# Patient Record
Sex: FEMALE | Race: WHITE | NOT HISPANIC OR LATINO | Employment: OTHER | URBAN - NONMETROPOLITAN AREA
[De-identification: names, ages, dates, MRNs, and addresses within clinical notes are randomized per-mention and may not be internally consistent; named-entity substitution may affect disease eponyms.]

---

## 2017-01-12 RX ORDER — FLUCONAZOLE 150 MG/1
150 TABLET ORAL ONCE
Qty: 2 TABLET | Refills: 0 | Status: SHIPPED | OUTPATIENT
Start: 2017-01-12 | End: 2017-01-12

## 2017-01-12 NOTE — PROGRESS NOTES
Pt stopped in [facility] location. Having thick white discharge and itching. I have no way of testing for yeast here. I will call in 2 diflucan as pt is establish and I've seen her twice. RTC if symptoms persist.

## 2017-04-05 RX ORDER — DILTIAZEM HYDROCHLORIDE 180 MG/1
CAPSULE, COATED, EXTENDED RELEASE ORAL
Qty: 30 CAPSULE | Refills: 5 | Status: SHIPPED | OUTPATIENT
Start: 2017-04-05 | End: 2017-12-01 | Stop reason: SDUPTHER

## 2017-08-14 ENCOUNTER — OFFICE VISIT (OUTPATIENT)
Dept: ENDOCRINOLOGY | Facility: CLINIC | Age: 47
End: 2017-08-14

## 2017-08-14 ENCOUNTER — TELEPHONE (OUTPATIENT)
Dept: ENDOCRINOLOGY | Facility: CLINIC | Age: 47
End: 2017-08-14

## 2017-08-14 VITALS
SYSTOLIC BLOOD PRESSURE: 126 MMHG | HEIGHT: 58 IN | WEIGHT: 173.3 LBS | HEART RATE: 118 BPM | BODY MASS INDEX: 36.38 KG/M2 | DIASTOLIC BLOOD PRESSURE: 72 MMHG

## 2017-08-14 DIAGNOSIS — E78.2 MIXED DIABETIC HYPERLIPIDEMIA ASSOCIATED WITH TYPE 2 DIABETES MELLITUS (HCC): ICD-10-CM

## 2017-08-14 DIAGNOSIS — E11.69 MIXED DIABETIC HYPERLIPIDEMIA ASSOCIATED WITH TYPE 2 DIABETES MELLITUS (HCC): ICD-10-CM

## 2017-08-14 DIAGNOSIS — I15.2 HYPERTENSION ASSOCIATED WITH DIABETES (HCC): ICD-10-CM

## 2017-08-14 DIAGNOSIS — E11.9 TYPE 2 DIABETES MELLITUS WITH FEATURES OF INSULIN RESISTANCE (HCC): Primary | ICD-10-CM

## 2017-08-14 DIAGNOSIS — E11.59 HYPERTENSION ASSOCIATED WITH DIABETES (HCC): ICD-10-CM

## 2017-08-14 DIAGNOSIS — Z79.4 TYPE 2 DIABETES MELLITUS WITHOUT COMPLICATION, WITH LONG-TERM CURRENT USE OF INSULIN (HCC): Primary | ICD-10-CM

## 2017-08-14 DIAGNOSIS — E11.9 TYPE 2 DIABETES MELLITUS WITHOUT COMPLICATION, WITH LONG-TERM CURRENT USE OF INSULIN (HCC): Primary | ICD-10-CM

## 2017-08-14 LAB — GLUCOSE BLDC GLUCOMTR-MCNC: 340 MG/DL (ref 70–130)

## 2017-08-14 PROCEDURE — PTNOCHG PR CUSTOM PT NO CHARGE VISIT: Performed by: INTERNAL MEDICINE

## 2017-08-14 PROCEDURE — 99204 OFFICE O/P NEW MOD 45 MIN: CPT | Performed by: INTERNAL MEDICINE

## 2017-08-14 PROCEDURE — 82962 GLUCOSE BLOOD TEST: CPT | Performed by: INTERNAL MEDICINE

## 2017-08-14 NOTE — PROGRESS NOTES
Humberto Colvin is a 46 y.o. female seen by diabetes educator 08/14/2017 for insertion of Crispin diagnostic continuous glucose monitor.     Sensor inserted in office. Instructed patient to wear sensor up to 14 days. Patient is to return to clinic in 2 weeks for sensor removal and results. Instructed patient to remove sensor if he has a CT scan, MRI, or X-ray, or if skin irritation occurs.      Carbohydrate Counting   i. Provided patient with detailed carbohydrate counting guide   ii. Instructed patient to eat 45-60 grams of carbohydrate with each meal and 15-30 grams of carbohydrate for snacks   iii. Provided patient with list of non-starchy vegetables and foods that are low in carbohydrate for snacks and to incorporate with meals    iv. Reviewed carbohydrate-containing foods, standard serving sizes, measuring foods, and nutrition label reading.    v. Reviewed the difference between simple and complex carbohydrate. Encouraged patient to choose complex carbohydrates more often.      Hypoglycemia   i. Reviewed the signs and symptoms of low blood sugar   ii. Explained a low blood sugar is a blood glucose reading below 70 mg/dl   iii. Provided patient with handout detailing proper treatment guidelines    Dexcom referral submitted 08/14/2017.       Soraya Benavidez MS, RD, LD, CDE

## 2017-08-14 NOTE — PROGRESS NOTES
"Humberto Colvin is a 46 y.o. female who presents for  evaluation of   Chief Complaint   Patient presents with   • Diabetes       Referring provider    POORNIMA Penny  [address] [place] [address]    Primary Care Provider    POORNIMA Pina    Duration 10 years    Timing - Diabetes is Constant    Quality -  poorly controlled    Severity -  severe    Complications - peripheral neuropathy    Current symptoms/problems  increase appetite, paresthesia of the feet, polydipsia and polyuria     Alleviating Factors: Compliance      Side Effects  none    Current diet  in general, an \"unhealthy\" diet    Current exercise none    Current monitoring regimen: home blood tests - 3 times daily  Home blood sugar records: 50 to 500    Hypoglycemia yes     Past Medical History:   Diagnosis Date   • Abdominal pain, epigastric    • Abrasion     an d/or friction burn   • Abrasion     and/or friction burn of hand, INFECTED   • Acquired hypothyroidism    • Acute bronchitis    • Acute hypokalemia    • Acute otitis media     right   • Acute sinusitis    • Acute vaginitis    • Anemia    • Aphthous ulceration     of skin and/or mucous membrane      • Asthma    • Astigmatism    • Backache     chronic   • Benign neoplasm of choroid     OS   • Candidiasis     skin and vagina   • Carpal tunnel syndrome    • Cellulitis of foot    • Cobalamin deficiency    • COLD (chronic obstructive lung disease)    • Cough    • Cyst of Bartholin's gland duct    • Diabetes mellitus     no retinopathy   • Diarrhea    • Disorder     Disorder due to type 2 diabetes mellitus     • Dyslipidemia    • Dysphagia    • Dyspnea    • Dysuria    • Encounter for gynecological examination with abnormal finding    • Encounter for screening mammogram for malignant neoplasm of breast    • Epigastric pain    • Essential hypertension    • Fatigue    • Gastritis    • Generalized abdominal pain    • GERD (gastroesophageal reflux disease)    • GI " allergy to food      cow milk, shrimp, sesame seed   • HA (headache)    • Hashimoto's thyroiditis    • Hearing loss    • Heartburn    • Herpes labialis    • History of chest pain    • Hyperlipidemia    • Hypertensive disorder    • Influenza-like illness    • Insomnia    • Jaw pain    • Maculopapular rash     eruption   • Menopause    • Myopia    • Nasal vestibulitis    • Nausea and vomiting    • Neck pain     chronic   • Need for influenza vaccination    • Neurologic disorder associated with diabetes mellitus    • Neuropathy    • Obesity    • Pain in lower limb    • Pain in wrist    • Patient currently pregnant     - G 2, P 1, 0-1-1   • Peptic stricture of esophagus    • Prescription refill     renewal   • Proteinuria    • Pruritus of vagina    • Restless legs    • Right upper quadrant pain    • Serous otitis media    • Skin lesion    • Sleep apnea    • Stricture of esophagus    • Symptomatic menopausal or female climacteric states    • Tendinitis    • Tobacco use     and exposure   • Type 2 diabetes mellitus    • Upper respiratory infection    • Vomiting      Family History   Problem Relation Age of Onset   • Diabetes Mother    • Hypertension Mother    • Other Mother      respiratory disorder   • Diabetes Father    • Heart disease Father    • Hypertension Father    • Lung cancer Father    • Diabetes Paternal Grandmother    • Hypertension Paternal Grandmother    • Hypertension Paternal Grandfather    • Diabetes Paternal Grandfather    • Stroke Other      Social History   Substance Use Topics   • Smoking status: Former Smoker   • Smokeless tobacco: Never Used   • Alcohol use No         Current Outpatient Prescriptions:   •  ASPIRIN LOW DOSE PO, Take  by mouth daily., Disp: , Rfl:   •  Blood Glucose Monitoring Suppl (FREESTYLE LITE) device, , Disp: , Rfl:   •  citalopram (CeleXA) 20 MG tablet, , Disp: , Rfl:   •  clotrimazole-betamethasone (LOTRISONE) 1-0.05 % cream, Apply  topically 2 (two) times a day., Disp: 45 g,  Rfl: 0  •  diltiaZEM CD (CARDIZEM CD) 180 MG 24 hr capsule, TAKE ONE CAPSULE BY MOUTH IN THE MORNING, Disp: 30 capsule, Rfl: 5  •  Dulaglutide (TRULICITY) 1.5 MG/0.5ML solution pen-injector, Inject  under the skin., Disp: , Rfl:   •  EASY TOUCH PEN NEEDLES 31G X 6 MM misc, , Disp: , Rfl:   •  estradiol (ESTRACE) 0.5 MG tablet, Take 1 tablet by mouth daily., Disp: 30 tablet, Rfl: 11  •  fexofenadine (ALLEGRA) 180 MG tablet, , Disp: , Rfl:   •  fluticasone (FLONASE) 50 MCG/ACT nasal spray, , Disp: , Rfl:   •  furosemide (LASIX) 20 MG tablet, Take 20 mg by mouth daily., Disp: , Rfl:   •  glucose blood (ACCU-CHEK GEOVANNA PLUS) test strip, 1 each by Other route 3 (three) times a day. Use as instructed, Disp: , Rfl:   •  insulin aspart (NOVOLOG FLEXPEN) 100 UNIT/ML solution pen-injector sc pen, , Disp: , Rfl:   •  Insulin Pen Needle (NOVOFINE) 30G X 8 MM misc, As directed, Disp: , Rfl:   •  ipratropium-albuterol (DUO-NEB) 0.5-2.5 mg/mL nebulizer, , Disp: , Rfl:   •  Lancets (ACCU-CHEK SAFE-T PRO) lancets, 1 each by Other route 3 (three) times a day. 23 gauge, Disp: , Rfl:   •  levothyroxine (SYNTHROID, LEVOTHROID) 100 MCG tablet, Take 100 mcg by mouth daily., Disp: , Rfl:   •  lisinopril (PRINIVIL,ZESTRIL) 10 MG tablet, Take 10 mg by mouth daily., Disp: , Rfl:   •  metFORMIN XR (GLUCOPHAGE-XR) 500 MG 24 hr tablet, Take 1,000 mg by mouth 2 (two) times a day., Disp: , Rfl:   •  montelukast (SINGULAIR) 10 MG tablet, , Disp: , Rfl:   •  potassium chloride ER (K-TAB) 20 MEQ tablet controlled-release ER tablet, , Disp: , Rfl:   •  pramipexole (MIRAPEX) 0.5 MG tablet, Take 1 tablet by mouth every night., Disp: , Rfl:   •  ranitidine (ZANTAC) 150 MG tablet, Take 150 mg by mouth 2 (two) times a day., Disp: , Rfl:   •  TRESIBA FLEXTOUCH 200 UNIT/ML solution pen-injector, , Disp: , Rfl:   •  albuterol (PROAIR HFA) 108 (90 BASE) MCG/ACT inhaler, Inhale 2 puffs 4 (four) times a day as needed for wheezing., Disp: , Rfl:   •  albuterol  (VENTOLIN HFA) 108 (90 BASE) MCG/ACT inhaler, , Disp: , Rfl:   •  azithromycin (ZITHROMAX Z-MYKE) 250 MG tablet, Take 2 tablets the first day, then 1 tablet daily for 4 days., Disp: 6 tablet, Rfl: 0  •  gemfibrozil (LOPID) 600 MG tablet, , Disp: , Rfl:   •  Insulin Aspart (NOVOLOG PENFILL) 100 UNIT/ML solution cartridge, Inject  under the skin. 20 unit (insulin) 3 times per day Subcutaneous, Disp: , Rfl:   •  IPRATROPIUM-ALBUTEROL IN, Inhale., Disp: , Rfl:   •  miconazole (MICONAZOLE 7) 2 % vaginal cream, , Disp: , Rfl:   •  omeprazole (PriLOSEC) 20 MG capsule, Take 20 mg by mouth 2 (two) times a day., Disp: , Rfl:   •  sitaGLIPtin (JANUVIA) 100 MG tablet, Take 100 mg by mouth daily., Disp: , Rfl:   •  SYMBICORT 160-4.5 MCG/ACT inhaler, Take 1 puff by mouth 2 (two) times a day., Disp: , Rfl:   •  vitamin D (ERGOCALCIFEROL) 23252 UNITS capsule capsule, Take 1 capsule by mouth 1 (one) time per week., Disp: , Rfl:   •  ZETIA 10 MG tablet, Take 1 tablet by mouth every night., Disp: , Rfl:   •  zolpidem (AMBIEN) 10 MG tablet, Take 1 tablet by mouth every night., Disp: , Rfl:     Review of Systems    Review of Systems   Constitutional: Positive for fatigue and unexpected weight change. Negative for activity change, appetite change, chills, diaphoresis and fever.   HENT: Negative for congestion, dental problem, drooling, ear discharge, ear pain, facial swelling, mouth sores, postnasal drip, rhinorrhea, sinus pressure, sore throat, tinnitus, trouble swallowing and voice change.    Eyes: Negative for photophobia, pain, discharge, redness, itching and visual disturbance.   Respiratory: Negative for apnea, cough, choking, chest tightness, shortness of breath, wheezing and stridor.    Cardiovascular: Negative for chest pain, palpitations and leg swelling.   Gastrointestinal: Negative for abdominal distention, abdominal pain, constipation, diarrhea, nausea and vomiting.   Endocrine: Positive for polydipsia, polyphagia and  "polyuria. Negative for cold intolerance and heat intolerance.   Genitourinary: Negative for decreased urine volume, difficulty urinating, dysuria, flank pain, frequency, hematuria and urgency.   Musculoskeletal: Positive for arthralgias and gait problem. Negative for back pain, joint swelling, myalgias, neck pain and neck stiffness.   Skin: Negative for color change, pallor, rash and wound.   Allergic/Immunologic: Negative for immunocompromised state.   Neurological: Positive for weakness and headaches. Negative for dizziness, tremors, seizures, syncope, facial asymmetry, speech difficulty, light-headedness and numbness.   Hematological: Negative for adenopathy.   Psychiatric/Behavioral: Positive for decreased concentration. Negative for agitation, behavioral problems, confusion, dysphoric mood, hallucinations, self-injury, sleep disturbance and suicidal ideas. The patient is nervous/anxious. The patient is not hyperactive.         Objective:   /72 (BP Location: Right arm, Patient Position: Sitting, Cuff Size: Adult)  Pulse 118  Ht 58\" (147.3 cm)  Wt 173 lb 4.8 oz (78.6 kg)  LMP  (Approximate)  BMI 36.22 kg/m2    Physical Exam   Constitutional: She is oriented to person, place, and time. She appears well-developed.   HENT:   Head: Normocephalic.   Right Ear: External ear normal.   Left Ear: External ear normal.   Nose: Nose normal.   Eyes: Conjunctivae and EOM are normal. No scleral icterus.   Neck: Normal range of motion. Neck supple. No tracheal deviation present. No thyromegaly present.   Cardiovascular: Normal rate, regular rhythm, normal heart sounds and intact distal pulses.  Exam reveals no gallop and no friction rub.    No murmur heard.  Pulmonary/Chest: Effort normal and breath sounds normal. No stridor. No respiratory distress. She has no wheezes. She has no rales. She exhibits no tenderness.   Abdominal: Soft. Bowel sounds are normal. She exhibits no distension and no mass. There is no " tenderness. There is no rebound and no guarding.   Musculoskeletal: Normal range of motion. She exhibits no tenderness or deformity.   Lipodystrophic features, lack of subcut tissue leads to prominent veins and musculature in legs.    Lymphadenopathy:     She has no cervical adenopathy.   Neurological: She is alert and oriented to person, place, and time. She displays normal reflexes. She exhibits normal muscle tone. Coordination normal.   Skin: No rash noted. No erythema. No pallor.   Psychiatric: She has a normal mood and affect. Her behavior is normal. Judgment and thought content normal.       Lab Review    Results for orders placed or performed in visit on 08/14/17   POC Glucose Fingerstick   Result Value Ref Range    Glucose 340 (A) 70 - 130 mg/dL           Assessment/Plan       ICD-10-CM ICD-9-CM   1. Type 2 diabetes mellitus without complication, unspecified long term insulin use status E11.9 250.00       Glycemic Management:   Lab Results   Component Value Date    HGBA1C 8.0 (H) 09/08/2015    HGBA1C 7.8 (H) 08/31/2015    HGBA1C 9.4 (H) 05/26/2015     Lab Results   Component Value Date    GLUCOSE 174 (H) 09/18/2015    BUN 9 09/18/2015    CREATININE 0.7 09/18/2015    K 3.7 09/18/2015    CO2 19.0 (L) 09/18/2015    CALCIUM 9.1 09/18/2015    ALBUMIN 3.5 09/08/2015    AST 30 09/08/2015    ALT 18 09/08/2015    ANIONGAP 13.0 09/18/2015     Lab Results   Component Value Date    WBC 7.8 09/18/2015    HGB 12.1 09/18/2015    HCT 36.8 09/18/2015    MCV 91.1 09/18/2015     09/18/2015     Insulin resistant diabetes with lipodystrophic features     Stop januvia, tresiba, novolog and for now invokana    ---    Keep trulicity and metformin      Start u500 insulin 130 for bkfast and 85 for supper    If low sugar , less than 80 at lunch or supper, decrease bkfast dose to 120    If low sugar, less than 80 , at bedtime, or before bkfast, decrease evening dose to 75    ---      If after 1 week the majority of lunch and  supper readings are more than 200 - increase bkfast dose by 10    If the majority of supper and before bkfast readings are more than 200 - increase supper dose by 10       Approve for dexcom     Approve for Dexcom    Criteria for Dexcom Approval    #1  Patient has diabetes mellitus, insulin-dependent.    #2 She performs blood glucose testing 4 times daily and blood glucose log was brought to office with variability from .    #3  She is requiring  Basal insulin  and Prandial Insulin 3 times daily for a total of 4 injections per day.    #4 Based on blood glucose readings we are making adjustments.     #5 I have personally seen patient within the past 6 months    #6 We plan on seeing her every 2-3 months for continuous adjustment of her diabetes regimen     #7 patient has hypoglycemia with unawareness.    #8 patient has day-to-day variation in her mealtime which confounds the degree of insulin dosing with multiple daily injections.    #9 patient has completed diabetes education program with us.    #10 she has demonstrated the ability to self monitor her glucose.        #11 Patient is motivated in improving  diabetes control       Lipid Management  No results found for: CHOL  Lab Results   Component Value Date    TRIG 521 (H) 09/08/2015    TRIG 701 (H) 08/31/2015    TRIG 446 (H) 05/26/2015     Lab Results   Component Value Date    HDL 41.0 09/08/2015    HDL 44.0 08/31/2015    HDL 52.0 05/26/2015     No results found for: LDLCALC  No results found for: LDL  No results found for: LDLDIRECT    Only zetia, intolerant to statins    Blood Pressure Management:    Vitals:    08/14/17 1343   BP: 126/72   Pulse: 118     Lab Results   Component Value Date    GLUCOSE 174 (H) 09/18/2015    CALCIUM 9.1 09/18/2015    .0 09/18/2015    K 3.7 09/18/2015    CO2 19.0 (L) 09/18/2015    .0 09/18/2015    BUN 9 09/18/2015    CREATININE 0.7 09/18/2015    ANIONGAP 13.0 09/18/2015               Microvascular Complication  Monitoring:      Eye Exam Evaluation, within 1 year     -----------    Last Microalbumin-Proteinuria Assessment    No results found for: MALBCRERATIO    No results found for: UTPCR    -----------      Neuropathy, yes               Weight Related:   Wt Readings from Last 3 Encounters:   08/14/17 173 lb 4.8 oz (78.6 kg)   06/04/17 176 lb (79.8 kg)   01/28/17 175 lb (79.4 kg)     Body mass index is 36.22 kg/(m^2).        Diet interventions: moderate (500 kCal/d) deficit diet.      Bone Health    Lab Results   Component Value Date    CALCIUM 9.1 09/18/2015       Thyroid Health    Lab Results   Component Value Date    TSH 0.89 09/08/2015    TSH 1.90 05/26/2015    TSH 5.86 (H) 05/09/2015       Lab Results   Component Value Date    FREET4 1.53 09/08/2015    FREET4 1.34 05/26/2015    FREET4 1.62 07/08/2014         Other Diabetes Related Aspects       Lab Results   Component Value Date    ECJZXTXA35 203 (L) 09/18/2015       Start b12     Last celiac panel     Lab Results   Component Value Date    GDPABIGA 4 07/08/2014    TTRANSGLIGA <2 07/08/2014         I reviewed and summarized records from POORNIMA Pina from 2017 and I reviewed / ordered labs.     Orders Placed This Encounter   Procedures   • POC Glucose Fingerstick         A copy of my note was sent to POORNIMA Pina    Please see my above opinion and suggestions.

## 2017-08-14 NOTE — PATIENT INSTRUCTIONS
Stop januvia, tresiba, novolog and for now invokana    ---    Keep trulicity and metformin      Start u500 insulin 130 for bkfast and 85 for supper    If low sugar , less than 80 at lunch or supper, decrease bkfast dose to 120    If low sugar, less than 80 , at bedtime, or before bkfast, decrease evening dose to 75    ---      If after 1 week the majority of lunch and supper readings are more than 200 - increase bkfast dose by 10    If the majority of supper and before bkfast readings are more than 200 - increase supper dose by 10

## 2017-08-15 ENCOUNTER — TELEPHONE (OUTPATIENT)
Dept: FAMILY MEDICINE CLINIC | Facility: CLINIC | Age: 47
End: 2017-08-15

## 2017-08-15 NOTE — TELEPHONE ENCOUNTER
[facility] IN [place] LEFT MESSAGE NEEDING TO SPEAK WITH SOMEONE ON [facility]..PLEASE CALL THEM BACK

## 2017-08-16 ENCOUNTER — TELEPHONE (OUTPATIENT)
Dept: ENDOCRINOLOGY | Facility: CLINIC | Age: 47
End: 2017-08-16

## 2017-10-02 ENCOUNTER — OFFICE VISIT (OUTPATIENT)
Dept: ENDOCRINOLOGY | Facility: CLINIC | Age: 47
End: 2017-10-02

## 2017-10-02 ENCOUNTER — TELEPHONE (OUTPATIENT)
Dept: ENDOCRINOLOGY | Facility: CLINIC | Age: 47
End: 2017-10-02

## 2017-10-02 VITALS
SYSTOLIC BLOOD PRESSURE: 138 MMHG | HEIGHT: 59 IN | BODY MASS INDEX: 38.04 KG/M2 | HEART RATE: 99 BPM | DIASTOLIC BLOOD PRESSURE: 76 MMHG | WEIGHT: 188.7 LBS

## 2017-10-02 DIAGNOSIS — E11.9 TYPE 2 DIABETES MELLITUS WITHOUT COMPLICATION, WITH LONG-TERM CURRENT USE OF INSULIN (HCC): Primary | ICD-10-CM

## 2017-10-02 DIAGNOSIS — Z79.4 TYPE 2 DIABETES MELLITUS WITHOUT COMPLICATION, WITH LONG-TERM CURRENT USE OF INSULIN (HCC): Primary | ICD-10-CM

## 2017-10-02 DIAGNOSIS — E55.9 VITAMIN D DEFICIENCY: ICD-10-CM

## 2017-10-02 DIAGNOSIS — I15.2 HYPERTENSION ASSOCIATED WITH DIABETES (HCC): ICD-10-CM

## 2017-10-02 DIAGNOSIS — E78.2 MIXED DIABETIC HYPERLIPIDEMIA ASSOCIATED WITH TYPE 2 DIABETES MELLITUS (HCC): ICD-10-CM

## 2017-10-02 DIAGNOSIS — E11.59 HYPERTENSION ASSOCIATED WITH DIABETES (HCC): ICD-10-CM

## 2017-10-02 DIAGNOSIS — E11.69 MIXED DIABETIC HYPERLIPIDEMIA ASSOCIATED WITH TYPE 2 DIABETES MELLITUS (HCC): ICD-10-CM

## 2017-10-02 DIAGNOSIS — R20.0 NUMBNESS OF FINGERS OF BOTH HANDS: ICD-10-CM

## 2017-10-02 PROCEDURE — 99214 OFFICE O/P EST MOD 30 MIN: CPT | Performed by: NURSE PRACTITIONER

## 2017-10-02 PROCEDURE — PTNOCHG PR CUSTOM PT NO CHARGE VISIT: Performed by: NURSE PRACTITIONER

## 2017-10-02 PROCEDURE — 95251 CONT GLUC MNTR ANALYSIS I&R: CPT | Performed by: NURSE PRACTITIONER

## 2017-10-02 RX ORDER — FEXOFENADINE HCL 180 MG/1
TABLET ORAL
COMMUNITY
Start: 2016-06-02 | End: 2022-09-21

## 2017-10-02 RX ORDER — IPRATROPIUM BROMIDE AND ALBUTEROL SULFATE 2.5; .5 MG/3ML; MG/3ML
SOLUTION RESPIRATORY (INHALATION)
COMMUNITY
Start: 2015-11-12 | End: 2017-12-19

## 2017-10-02 RX ORDER — FAMOTIDINE 20 MG
TABLET ORAL
COMMUNITY
Start: 2017-04-13 | End: 2018-03-07

## 2017-10-02 RX ORDER — BUDESONIDE AND FORMOTEROL FUMARATE DIHYDRATE 160; 4.5 UG/1; UG/1
AEROSOL RESPIRATORY (INHALATION)
COMMUNITY
Start: 2016-07-28 | End: 2018-03-07

## 2017-10-02 RX ORDER — METFORMIN HYDROCHLORIDE 500 MG/1
TABLET, EXTENDED RELEASE ORAL
COMMUNITY
Start: 2015-11-12 | End: 2018-02-05 | Stop reason: SDUPTHER

## 2017-10-02 RX ORDER — NYSTATIN 100000 [USP'U]/G
POWDER TOPICAL 3 TIMES DAILY
Qty: 180 G | Refills: 1 | Status: SHIPPED | OUTPATIENT
Start: 2017-10-02 | End: 2017-12-19

## 2017-10-02 RX ORDER — FLUTICASONE PROPIONATE 50 MCG
SPRAY, SUSPENSION (ML) NASAL
COMMUNITY
Start: 2016-06-02 | End: 2022-09-21

## 2017-10-02 RX ORDER — DILTIAZEM HYDROCHLORIDE 180 MG/1
CAPSULE, COATED, EXTENDED RELEASE ORAL
COMMUNITY
Start: 2016-03-29 | End: 2017-10-07 | Stop reason: SDUPTHER

## 2017-10-02 RX ORDER — FUROSEMIDE 20 MG/1
TABLET ORAL
COMMUNITY
Start: 2016-03-28 | End: 2017-12-19

## 2017-10-02 RX ORDER — LEVOTHYROXINE SODIUM 0.1 MG/1
TABLET ORAL
COMMUNITY
Start: 2015-11-12 | End: 2022-04-18

## 2017-10-02 RX ORDER — GABAPENTIN 300 MG/1
CAPSULE ORAL
COMMUNITY
Start: 2017-04-13 | End: 2017-12-19

## 2017-10-02 RX ORDER — ASPIRIN 81 MG/1
TABLET, CHEWABLE ORAL
COMMUNITY
Start: 2015-11-12

## 2017-10-02 RX ORDER — LISINOPRIL 10 MG/1
TABLET ORAL
COMMUNITY
Start: 2015-11-12 | End: 2022-04-18

## 2017-10-02 RX ORDER — INSULIN HUMAN 500 [IU]/ML
INJECTION, SOLUTION SUBCUTANEOUS
COMMUNITY
Start: 2017-09-20 | End: 2018-08-14 | Stop reason: SDUPTHER

## 2017-10-02 RX ORDER — ESTRADIOL 0.5 MG/1
TABLET ORAL
COMMUNITY
Start: 2016-06-29 | End: 2017-10-06 | Stop reason: SDUPTHER

## 2017-10-02 RX ORDER — PRAMIPEXOLE DIHYDROCHLORIDE 0.5 MG/1
TABLET ORAL
COMMUNITY
Start: 2016-09-13 | End: 2017-12-19

## 2017-10-02 RX ORDER — MONTELUKAST SODIUM 10 MG/1
TABLET ORAL
COMMUNITY
Start: 2016-06-02 | End: 2022-04-18

## 2017-10-02 RX ORDER — EZETIMIBE 10 MG/1
TABLET ORAL
COMMUNITY
Start: 2016-03-28

## 2017-10-02 RX ORDER — CITALOPRAM 20 MG/1
TABLET ORAL
COMMUNITY
Start: 2016-04-27 | End: 2022-09-21

## 2017-10-02 NOTE — TELEPHONE ENCOUNTER
----- Message from Sushila Argueta sent at 10/2/2017  2:58 PM CDT -----  DEXCOM KEEPS SAYING SENSOR FAILED.    [phone]      Completed troubleshooting on sensor failure error. Instructed patient to change sensor and call Dexcom for replacement.

## 2017-10-02 NOTE — PROGRESS NOTES
Completed Dexcom CGM training on 10/02/2017.     Reviewed components of Dexcom system. Reviewed how to set up .  set up in office today.     Explained low and high blood glucose alerts, as well as rise and fall rates. Instructed patient on how to view trend graph.     Instructed patient on how to properly insert sensor including: skin prep, site selection, site rotation, sensor placement, and how to use insertion device. Patient inserted sensor in office today.     Instructed patient on how to insert transmitter into sensor dock. Patient did this in office. Explained battery life of transmitter. Will need to change transmitter every 3 months--reviewed how to change transmitter serial number in .     Instructed patient to not wear sensor or transmitter in xray, MRI, or CT Scan.    Discussed signal loss alert and how to troubleshoot. Advised patient to monitor blood sugar if taking acetaminophen, which may give a false high reading up to 4 hours after taking. Instructed patient to change sensor every 7 days to prevent skin irritation or infection. Advised patient to see primary physician if signs of skin infection develop.    Reviewed how to calibrate sensor: 2 consecutive fingersticks after 2 hour sensor start up then q 12 hours thereafter. Patient advised to check her blood sugar with a fingerstick if she questions the accuracy of the readings she is getting.     Advised patient to call Dexcom Customer Support if has a failed sensor or if has technical questions regarding CGM. Instructed patient on reordering sensor supplies.     Soraya Benavidez MS, RD, LD, CDE

## 2017-10-05 DIAGNOSIS — Z79.890 ON POSTMENOPAUSAL HORMONE REPLACEMENT THERAPY: ICD-10-CM

## 2017-10-05 RX ORDER — ESTRADIOL 0.5 MG/1
TABLET ORAL
Qty: 30 TABLET | Refills: 11 | OUTPATIENT
Start: 2017-10-05

## 2017-10-05 RX ORDER — SYRINGE,INSUL U-500,NDL,0.5ML 31GX15/64"
SYRINGE, EMPTY DISPOSABLE MISCELLANEOUS
Qty: 100 EACH | Refills: 11 | Status: SHIPPED | OUTPATIENT
Start: 2017-10-05 | End: 2019-05-01 | Stop reason: SDUPTHER

## 2017-10-06 ENCOUNTER — OFFICE VISIT (OUTPATIENT)
Dept: OBSTETRICS AND GYNECOLOGY | Facility: CLINIC | Age: 47
End: 2017-10-06

## 2017-10-06 VITALS
HEIGHT: 59 IN | WEIGHT: 191 LBS | HEART RATE: 96 BPM | BODY MASS INDEX: 38.51 KG/M2 | DIASTOLIC BLOOD PRESSURE: 78 MMHG | OXYGEN SATURATION: 97 % | SYSTOLIC BLOOD PRESSURE: 128 MMHG

## 2017-10-06 DIAGNOSIS — Z01.419 ENCOUNTER FOR GYNECOLOGICAL EXAMINATION: Primary | ICD-10-CM

## 2017-10-06 DIAGNOSIS — B37.31 CANDIDA VAGINITIS: ICD-10-CM

## 2017-10-06 DIAGNOSIS — N95.2 VAGINAL ATROPHY: ICD-10-CM

## 2017-10-06 DIAGNOSIS — Z12.72 VAGINAL PAP SMEAR: ICD-10-CM

## 2017-10-06 DIAGNOSIS — Z79.890 ON POSTMENOPAUSAL HORMONE REPLACEMENT THERAPY: ICD-10-CM

## 2017-10-06 DIAGNOSIS — K64.9 HEMORRHOIDS, UNSPECIFIED HEMORRHOID TYPE: ICD-10-CM

## 2017-10-06 PROCEDURE — 87210 SMEAR WET MOUNT SALINE/INK: CPT | Performed by: NURSE PRACTITIONER

## 2017-10-06 PROCEDURE — 99396 PREV VISIT EST AGE 40-64: CPT | Performed by: NURSE PRACTITIONER

## 2017-10-06 PROCEDURE — 88142 CYTOPATH C/V THIN LAYER: CPT | Performed by: PATHOLOGY

## 2017-10-06 RX ORDER — ESTRADIOL 0.5 MG/1
0.5 TABLET ORAL DAILY
Qty: 30 TABLET | Refills: 11 | Status: SHIPPED | OUTPATIENT
Start: 2017-10-06 | End: 2018-10-08 | Stop reason: SDUPTHER

## 2017-10-06 RX ORDER — FLUCONAZOLE 150 MG/1
150 TABLET ORAL ONCE
Qty: 2 TABLET | Refills: 0 | Status: SHIPPED | OUTPATIENT
Start: 2017-10-06 | End: 2017-10-06

## 2017-10-06 NOTE — PROGRESS NOTES
Subjective   Chief Complaint   Patient presents with   • Gynecologic Exam     pap/well woman exam     Humberto Colvin is a 47 y.o. year old  presenting to be seen for her annual exam.  She states she has noticed vaginal itching lately. She is a diabetic and now has a continuous blood glucose monitor in the LLQ of the abdomen. States her glucose has been better controlled with this.     She also states estradiol 0.5mg may not be strong enough. Complains of some hot flashes still and vaginal dryness. Due to comorbidity, I do not recommend increasing PO HRT but we discussed coconut oil as lubrication and adding topical estrogen. Pt is agreeable to this plan.     She is not sexually active.  In the past 12 months there has not been new sexual partners.  Condoms are not typically used.  She would not like to be screened for STD's at today's exam.     She exercises regularly: no.  She wears her seat belt:yes.  She has concerns about domestic violence: no.  She is taking Vit D and Calcium:yes  Last colonoscopy:   Last DEXA:   Last MM17  Last PAP: 16  Hx of abnormal pap: Yes, requiring Hysterectomy w/BSO in       SURGICAL MENOPAUSE  LMP:     OB History      Para Term  AB Living    2 1   1     SAB TAB Ectopic Multiple Live Births    1              Additional Complaints:  Hemorrhoids  Patient presents for evaluation of possible hemorrhoids. Onset of symptoms was gradual. Symptoms have been gradually worsening since that time. Symptoms include: bleeding which only occurs with bowel movements and painful defecation.     The following portions of the patient's history were reviewed and updated as appropriate:problem list, current medications, allergies, past family history, past medical history, past social history and past surgical history     .Smoking status: Former Smoker                                                              Packs/day: 0.00      Years: 0.00     "  Smokeless status: Never Used                        Review of Systems   Constitutional: Negative.  Negative for chills and fever.   Respiratory: Negative.         Asthma well controlled at this time   Cardiovascular: Negative.    Gastrointestinal: Positive for rectal pain (hemorrhoids). Negative for constipation and diarrhea.   Endocrine: Positive for heat intolerance (hot flashes).        Diabetic   Genitourinary: Positive for vaginal pain (dryness and itching). Negative for dysuria, frequency, genital sores, hematuria, pelvic pain, urgency and vaginal discharge.   Skin: Negative.  Negative for rash.   Neurological: Negative for dizziness, syncope, light-headedness and headaches.   Psychiatric/Behavioral: Negative for suicidal ideas. The patient is not nervous/anxious.          Objective   /78  Pulse 96  Ht 59\" (149.9 cm)  Wt 191 lb (86.6 kg)  LMP 10/06/2001 (Approximate)  SpO2 97%  BMI 38.58 kg/m2    General:  well developed; well nourished  no acute distress  obese - Body mass index is 38.58 kg/(m^2).   Skin:  No suspicious lesions seen   Cardiac: Heart sounds are normal.  Regular rate and rhythm without murmur, gallop or rub.   Resp:  Normal expansion.  Clear to auscultation.  No rales, rhonchi, or wheezing.   Thyroid: not examined   Breasts:  Examined in supine position  Symmetric without masses or skin dimpling  Nipples normal without inversion, lesions or discharge  There are no palpable axillary nodes   Abdomen: soft, non-tender; no masses  no umbilical or inginual hernias are present  no hepato-splenomegaly  Normal findings: bowel sounds normal   Psych: alert,oriented, in NAD with a full range of affect, normal behavior and no psychotic features   Pelvis: Clinical staff was present for exam  External genitalia:  normal appearance of the external genitalia including Bartholin's and [place]'s glands. atrophy and irritation noted inside the labia majora  :  urethral meatus normal;  Vaginal:  " atrophic mucosal changes are present; discharge present -  white and thin; wet prep done: clue cells are absent, pseudo-hyphae are present, trichomonads are absent and excess WBC's are present;  Cervix:  absent.  Uterus:  absent.  Adnexa:  absent, bilateral.  Rectal:  digital rectal exam not performed; anus visually normal appearing. external hemorrhoids present;     Lab Review   No data reviewed    Imaging  Mammogram report from [facility] scanned to chart       Assessment   1. Encounter for GYN exam   2. Vaginal pap smear  3. Hemorrhoids  4. Vaginal atrophy  5. On postmenopause HRT  6. Candida vaginitis     Plan   1. Pap results: I will send card in mail or call if abnormal. Due to history of high grade cervical lesion, I recommend annual exams.   2. Preparation H and tucks pads prescribed. Encouraged high fiber and fluid diet with a stool softener to reduce hardened stools  3. A 12 g sample of estrace provided to pt. Encouraged 2 gram dose 2-3 days per week. She will call out office if she notices improvement and would like a presription.   4. Continue estradiol 0.5mg PO daily. I do not feel comfortable increasing dose due to her other health concerns. She verbalizes understanding   5. Diflucan 150mg x 2 doses, 72 hours apart. RTC If yeast symptoms continue.     New Medications Ordered This Visit   Medications   • estradiol (ESTRACE) 0.5 MG tablet     Sig: Take 1 tablet by mouth Daily.     Dispense:  30 tablet     Refill:  11   • fluconazole (DIFLUCAN) 150 MG tablet     Sig: Take 1 tablet by mouth 1 (One) Time for 1 dose. Repeat dose in 72 hours if still symptomatic     Dispense:  2 tablet     Refill:  0   • phenylephrine-cocoa Butter (PREPARATION H) 0.25-88.44 % suppository suppository     Sig: Insert 1 suppository into the rectum As Needed for Hemorrhoids.     Dispense:  12 suppository     Refill:  0   • Witch Hazel (TUCKS) 50 % pads     Sig: Apply 1 application topically 2 (Two) Times a Day As Needed (hemorrhoids).      Dispense:  40 each     Refill:  0          This note was electronically signed.    Tracie Wiley, APRN  October 6, 2017

## 2017-10-10 LAB
LAB AP CASE REPORT: NORMAL
LAB AP GYN ADDITIONAL INFORMATION: NORMAL
LAB AP GYN OTHER FINDINGS: NORMAL
Lab: NORMAL
PATH INTERP SPEC-IMP: NORMAL
STAT OF ADQ CVX/VAG CYTO-IMP: NORMAL

## 2017-11-07 RX ORDER — ESTRADIOL 0.1 MG/G
2 CREAM VAGINAL DAILY
Qty: 42.5 G | Refills: 2 | Status: SHIPPED | OUTPATIENT
Start: 2017-11-07 | End: 2017-12-19

## 2017-11-07 NOTE — PROGRESS NOTES
Pt came by office stating estrace sample has improved her symptoms. She would like a prescription. Informed insurance may not pay for it. We will try and see. If not consider compounded estriol 0.1%. Pt is agreeable to plan of care.

## 2017-12-01 RX ORDER — DILTIAZEM HYDROCHLORIDE 180 MG/1
CAPSULE, EXTENDED RELEASE ORAL
Qty: 30 CAPSULE | Refills: 3 | Status: SHIPPED | OUTPATIENT
Start: 2017-12-01 | End: 2018-04-10 | Stop reason: SDUPTHER

## 2018-02-05 ENCOUNTER — OFFICE VISIT (OUTPATIENT)
Dept: ENDOCRINOLOGY | Facility: CLINIC | Age: 48
End: 2018-02-05

## 2018-02-05 VITALS
BODY MASS INDEX: 39.92 KG/M2 | HEART RATE: 98 BPM | HEIGHT: 59 IN | DIASTOLIC BLOOD PRESSURE: 80 MMHG | SYSTOLIC BLOOD PRESSURE: 126 MMHG | WEIGHT: 198 LBS

## 2018-02-05 DIAGNOSIS — R20.0 NUMBNESS OF FINGERS OF BOTH HANDS: ICD-10-CM

## 2018-02-05 DIAGNOSIS — E11.9 TYPE 2 DIABETES MELLITUS WITHOUT COMPLICATION, WITH LONG-TERM CURRENT USE OF INSULIN (HCC): Primary | ICD-10-CM

## 2018-02-05 DIAGNOSIS — E11.59 HYPERTENSION ASSOCIATED WITH DIABETES (HCC): ICD-10-CM

## 2018-02-05 DIAGNOSIS — Z79.4 TYPE 2 DIABETES MELLITUS WITHOUT COMPLICATION, WITH LONG-TERM CURRENT USE OF INSULIN (HCC): Primary | ICD-10-CM

## 2018-02-05 DIAGNOSIS — E78.2 MIXED DIABETIC HYPERLIPIDEMIA ASSOCIATED WITH TYPE 2 DIABETES MELLITUS (HCC): ICD-10-CM

## 2018-02-05 DIAGNOSIS — E55.9 VITAMIN D DEFICIENCY: ICD-10-CM

## 2018-02-05 DIAGNOSIS — I15.2 HYPERTENSION ASSOCIATED WITH DIABETES (HCC): ICD-10-CM

## 2018-02-05 DIAGNOSIS — E11.69 MIXED DIABETIC HYPERLIPIDEMIA ASSOCIATED WITH TYPE 2 DIABETES MELLITUS (HCC): ICD-10-CM

## 2018-02-05 PROCEDURE — 99214 OFFICE O/P EST MOD 30 MIN: CPT | Performed by: NURSE PRACTITIONER

## 2018-02-05 RX ORDER — PEN NEEDLE, DIABETIC 31 G X1/4"
NEEDLE, DISPOSABLE MISCELLANEOUS
Qty: 150 EACH | Refills: 11 | Status: SHIPPED | OUTPATIENT
Start: 2018-02-05

## 2018-02-05 RX ORDER — METFORMIN HYDROCHLORIDE 500 MG/1
TABLET, EXTENDED RELEASE ORAL
Qty: 120 TABLET | Refills: 11 | Status: SHIPPED | OUTPATIENT
Start: 2018-02-05 | End: 2019-01-28 | Stop reason: SDUPTHER

## 2018-02-05 NOTE — PROGRESS NOTES
"  Subjective    Humberto Colvin is a 47 y.o. female. she is here today for follow-up.    History of Present Illness         Primary Care Provider     POORNIMA Pina     Duration 10 years     Timing - Diabetes is Constant     Quality -  poorly controlled     Severity -  severe     Complications - peripheral neuropathy     Current symptoms/problems  increase appetite, paresthesia of the feet, polydipsia and polyuria      Alleviating Factors: Compliance       Side Effects  none     Current diet  in general, an \"unhealthy\" diet     Current exercise none     Current monitoring regimen: home blood tests - 3 times daily  Home blood sugar records:     Improved from previous    Lowest 80      Hypoglycemia yes          The following portions of the patient's history were reviewed and updated as appropriate:   Past Medical History:   Diagnosis Date   • Abdominal pain, epigastric    • Abrasion     an d/or friction burn   • Abrasion     and/or friction burn of hand, INFECTED   • Acquired hypothyroidism    • Acute bronchitis    • Acute hypokalemia    • Acute otitis media     right   • Acute sinusitis    • Acute vaginitis    • Anemia    • Aphthous ulceration     of skin and/or mucous membrane      • Asthma    • Astigmatism    • Backache     chronic   • Benign neoplasm of choroid     OS   • Candidiasis     skin and vagina   • Carpal tunnel syndrome    • Cellulitis of foot    • Cobalamin deficiency    • COLD (chronic obstructive lung disease)    • Cough    • Cyst of Bartholin's gland duct    • Diabetes mellitus     no retinopathy   • Diarrhea    • Disorder     Disorder due to type 2 diabetes mellitus     • Dyslipidemia    • Dysphagia    • Dyspnea    • Dysuria    • Encounter for gynecological examination with abnormal finding    • Encounter for screening mammogram for malignant neoplasm of breast    • Epigastric pain    • Essential hypertension    • Fatigue    • Gastritis    • Generalized abdominal pain    • GERD " (gastroesophageal reflux disease)    • GI allergy to food      cow milk, shrimp, sesame seed   • HA (headache)    • Hashimoto's thyroiditis    • Hearing loss    • Heartburn    • Herpes labialis    • History of chest pain    • Hyperlipidemia    • Hypertensive disorder    • Influenza-like illness    • Insomnia    • Jaw pain    • Maculopapular rash     eruption   • Menopause    • Myopia    • Nasal vestibulitis    • Nausea and vomiting    • Neck pain     chronic   • Need for influenza vaccination    • Neurologic disorder associated with diabetes mellitus    • Neuropathy    • Obesity    • Pain in lower limb    • Pain in wrist    • Patient currently pregnant     - G 2, P 1, 0-1-1   • Peptic stricture of esophagus    • Prescription refill     renewal   • Proteinuria    • Pruritus of vagina    • Restless legs    • Right upper quadrant pain    • Serous otitis media    • Skin lesion    • Sleep apnea    • Stricture of esophagus    • Symptomatic menopausal or female climacteric states    • Tendinitis    • Tobacco use     and exposure   • Type 2 diabetes mellitus    • Upper respiratory infection    • Vomiting      Past Surgical History:   Procedure Laterality Date   • CARDIAC CATHETERIZATION  05/23/2013    No significant epicardial coronary artery disease. Normal left ventricular systolic function.   • CHOLECYSTECTOMY     • COLONOSCOPY  07/18/2014    internal & external hemorrhoids found.   • ENDOSCOPY  10/19/2015    Esophageal stricture was present.Dilatation performed.Normal stomach.Normal duodenum.   • ENDOSCOPY  07/18/2014    Esophageal stricture present. Dilatation performed. Gastritis in stomach. Biospy taken. Normal duodenum.   • EXCISION LESION      Back/Flank Lipoma removed from back   • INJECTION OF MEDICATION  11/09/2015    B12   • INJECTION OF MEDICATION  06/09/2015    Kenalog   • PAP SMEAR  03/30/2011    Negative   • TOTAL ABDOMINAL HYSTERECTOMY WITH SALPINGO OOPHORECTOMY     • VAGINAL DELIVERY      x1     Family  History   Problem Relation Age of Onset   • Diabetes Mother    • Hypertension Mother    • Other Mother      respiratory disorder   • Diabetes Father    • Heart disease Father    • Hypertension Father    • Lung cancer Father    • Diabetes Paternal Grandmother    • Hypertension Paternal Grandmother    • Hypertension Paternal Grandfather    • Diabetes Paternal Grandfather    • Stroke Other      OB History      Para Term  AB Living    2 1   1     SAB TAB Ectopic Multiple Live Births    1            Current Outpatient Prescriptions   Medication Sig Dispense Refill   • albuterol (PROAIR HFA) 108 (90 BASE) MCG/ACT inhaler Inhale 2 puffs 4 (four) times a day as needed for wheezing.     • aspirin 81 MG chewable tablet 1 PO QD for cardiovascular protection     • BD INSULIN SYRINGE U-500 31G X 6MM 0.5 ML misc USE THREE TIMES A DAY WITH INSULIN 100 each 11   • Blood Glucose Monitoring Suppl (FREESTYLE LITE) device      • brompheniramine-pseudoephedrine-DM 30-2-10 MG/5ML syrup Take 5 mL by mouth 4 (Four) Times a Day As Needed for Allergies. 118 mL 0   • budesonide-formoterol (SYMBICORT) 160-4.5 MCG/ACT inhaler 2 inhalations BID for COPD.  Rinse mouth after each use.     • CARTIA  MG 24 hr capsule TAKE ONE CAPSULE BY MOUTH IN THE MORNING 30 capsule 3   • citalopram (CeleXA) 20 MG tablet 1 tab PO QHS for anxiety & depression     • Dulaglutide (TRULICITY) 1.5 MG/0.5ML solution pen-injector Inject 1.5 mg under the skin 1 (One) Time Per Week. 4 pen 11   • EASY TOUCH PEN NEEDLES 31G X 6 MM misc 3 times daily 150 each 11   • estradiol (ESTRACE) 0.5 MG tablet Take 1 tablet by mouth Daily. 30 tablet 11   • ezetimibe (ZETIA) 10 MG tablet 1 tab PO QD for cholesterol     • fexofenadine (ALLEGRA) 180 MG tablet 1 tab PO QD prn allergy symptoms     • fluticasone (FLONASE) 50 MCG/ACT nasal spray Instill 2 sprays in each nostril QD for nasal congestion/drainage     • furosemide (LASIX) 40 MG tablet Take 40 mg by mouth Daily.      • gabapentin (NEURONTIN) 600 MG tablet Take 600 mg by mouth Every Night.     • gemfibrozil (LOPID) 600 MG tablet      • glucose blood (ACCU-CHEK GEOVANNA PLUS) test strip 1 each by Other route 3 (three) times a day. Use as instructed     • HUMULIN R 500 UNIT/ML CONCENTRATED injection      • Insulin Pen Needle (NOVOFINE) 30G X 8 MM misc As directed     • Insulin Regular Human, Conc, (HUMULIN R U-500 KWIKPEN) 500 UNIT/ML solution pen-injector CONCENTRATED injection Inject 150 units three times daily   If pen not covered used vials 3 pen 11   • IPRATROPIUM-ALBUTEROL IN Inhale.     • Lancets (ACCU-CHEK SAFE-T PRO) lancets 1 each by Other route 3 (three) times a day. 23 gauge     • levothyroxine (SYNTHROID, LEVOTHROID) 100 MCG tablet 1 tab PO QAM on empty stomach 1 hour before meals/meds     • lisinopril (PRINIVIL,ZESTRIL) 10 MG tablet 1 tab PO QD for BP & kidney protection     • metFORMIN ER (GLUCOPHAGE-XR) 500 MG 24 hr tablet 2 tablets po  tablet 11   • montelukast (SINGULAIR) 10 MG tablet 1 tab PO QHS for asthma     • omeprazole (PriLOSEC) 20 MG capsule Take 20 mg by mouth 2 (two) times a day.     • ranitidine (ZANTAC) 150 MG tablet Take 150 mg by mouth 2 (two) times a day.     • vitamin D (ERGOCALCIFEROL) 72742 UNITS capsule capsule Take 1 capsule by mouth 1 (one) time per week.     • Vitamin D, Cholecalciferol, 1000 units capsule 1 cap PO 2 x weekly for Vitamin D replacement       No current facility-administered medications for this visit.      Allergies   Allergen Reactions   • Betagen [Povidone Iodine]    • Celebrex [Celecoxib]    • Penicillins      Social History     Social History   • Marital status:      Spouse name: N/A   • Number of children: N/A   • Years of education: N/A     Social History Main Topics   • Smoking status: Former Smoker   • Smokeless tobacco: Never Used   • Alcohol use No   • Drug use: No   • Sexual activity: No     Other Topics Concern   • None     Social History Narrative  "      Review of Systems  Review of Systems   Constitutional: Negative for activity change, appetite change, diaphoresis and fatigue.   HENT: Negative for facial swelling, sneezing, sore throat, tinnitus, trouble swallowing and voice change.    Eyes: Negative for photophobia, pain, discharge, redness, itching and visual disturbance.   Respiratory: Negative for apnea, cough, choking, chest tightness and shortness of breath.    Cardiovascular: Negative for chest pain, palpitations and leg swelling.   Gastrointestinal: Negative for abdominal distention, abdominal pain, constipation, diarrhea, nausea and vomiting.   Endocrine: Negative for cold intolerance, heat intolerance, polydipsia, polyphagia and polyuria.   Genitourinary: Negative for difficulty urinating, dysuria, frequency, hematuria and urgency.   Musculoskeletal: Negative for arthralgias, back pain, gait problem, joint swelling, myalgias, neck pain and neck stiffness.   Skin: Negative for color change, pallor, rash and wound.   Neurological: Negative for dizziness, tremors, weakness, light-headedness, numbness and headaches.   Hematological: Negative for adenopathy. Does not bruise/bleed easily.   Psychiatric/Behavioral: Negative for behavioral problems, confusion and sleep disturbance.        Objective    /80 (BP Location: Right arm, Patient Position: Sitting, Cuff Size: Adult)  Pulse 98  Ht 149.9 cm (59\")  Wt 89.8 kg (198 lb)  LMP 10/06/2001 (Approximate)  BMI 39.99 kg/m2  Physical Exam   Constitutional: She is oriented to person, place, and time. She appears well-developed and well-nourished. No distress.   HENT:   Head: Normocephalic and atraumatic.   Right Ear: External ear normal.   Left Ear: External ear normal.   Nose: Nose normal.   Eyes: Conjunctivae and EOM are normal. Pupils are equal, round, and reactive to light.   Neck: Normal range of motion. Neck supple. No tracheal deviation present. No thyromegaly present.   Cardiovascular: Normal " rate, regular rhythm and normal heart sounds.    No murmur heard.  Pulmonary/Chest: Effort normal and breath sounds normal. No respiratory distress. She has no wheezes.   Abdominal: Soft. Bowel sounds are normal. There is no tenderness. There is no rebound and no guarding.   Musculoskeletal: Normal range of motion. She exhibits no edema, tenderness or deformity.   Neurological: She is alert and oriented to person, place, and time. No cranial nerve deficit.   Skin: Skin is warm and dry. No rash noted.   Psychiatric: She has a normal mood and affect. Her behavior is normal. Judgment and thought content normal.       Lab Review  Glucose (mg/dl)   Date Value   09/18/2015 174 (H)   09/08/2015 264 (H)   08/31/2015 297 (H)     Sodium (mmol/L)   Date Value   09/18/2015 138.0   09/08/2015 139.0   08/31/2015 138.0     Potassium (mmol/L)   Date Value   09/18/2015 3.7   09/08/2015 3.2 (L)   08/31/2015 3.8     Chloride (mmol/L)   Date Value   09/18/2015 106.0   09/08/2015 102.0   08/31/2015 100.0     CO2 (mmol/L)   Date Value   09/18/2015 19.0 (L)   09/08/2015 27.0   08/31/2015 27.0     BUN (mg/dl)   Date Value   09/18/2015 9   09/08/2015 12   08/31/2015 16     Creatinine (mg/dl)   Date Value   09/18/2015 0.7   09/08/2015 0.7   08/31/2015 0.7     Hemoglobin A1C (%TotHgb)   Date Value   09/08/2015 8.0 (H)   08/31/2015 7.8 (H)   05/26/2015 9.4 (H)     Triglycerides (mg/dl)   Date Value   09/08/2015 521 (H)   08/31/2015 701 (H)   05/26/2015 446 (H)       Assessment/Plan      1. Type 2 diabetes mellitus without complication, with long-term current use of insulin    2. Mixed diabetic hyperlipidemia associated with type 2 diabetes mellitus    3. Hypertension associated with diabetes    4. Vitamin D deficiency    5. Numbness of fingers of both hands    .    Medications prescribed:  Outpatient Encounter Prescriptions as of 2/5/2018   Medication Sig Dispense Refill   • albuterol (PROAIR HFA) 108 (90 BASE) MCG/ACT inhaler Inhale 2 puffs 4  (four) times a day as needed for wheezing.     • aspirin 81 MG chewable tablet 1 PO QD for cardiovascular protection     • BD INSULIN SYRINGE U-500 31G X 6MM 0.5 ML misc USE THREE TIMES A DAY WITH INSULIN 100 each 11   • Blood Glucose Monitoring Suppl (FREESTYLE LITE) device      • brompheniramine-pseudoephedrine-DM 30-2-10 MG/5ML syrup Take 5 mL by mouth 4 (Four) Times a Day As Needed for Allergies. 118 mL 0   • budesonide-formoterol (SYMBICORT) 160-4.5 MCG/ACT inhaler 2 inhalations BID for COPD.  Rinse mouth after each use.     • CARTIA  MG 24 hr capsule TAKE ONE CAPSULE BY MOUTH IN THE MORNING 30 capsule 3   • citalopram (CeleXA) 20 MG tablet 1 tab PO QHS for anxiety & depression     • Dulaglutide (TRULICITY) 1.5 MG/0.5ML solution pen-injector Inject 1.5 mg under the skin 1 (One) Time Per Week. 4 pen 11   • EASY TOUCH PEN NEEDLES 31G X 6 MM misc 3 times daily 150 each 11   • estradiol (ESTRACE) 0.5 MG tablet Take 1 tablet by mouth Daily. 30 tablet 11   • ezetimibe (ZETIA) 10 MG tablet 1 tab PO QD for cholesterol     • fexofenadine (ALLEGRA) 180 MG tablet 1 tab PO QD prn allergy symptoms     • fluticasone (FLONASE) 50 MCG/ACT nasal spray Instill 2 sprays in each nostril QD for nasal congestion/drainage     • furosemide (LASIX) 40 MG tablet Take 40 mg by mouth Daily.     • gabapentin (NEURONTIN) 600 MG tablet Take 600 mg by mouth Every Night.     • gemfibrozil (LOPID) 600 MG tablet      • glucose blood (ACCU-CHEK GEOVANNA PLUS) test strip 1 each by Other route 3 (three) times a day. Use as instructed     • HUMULIN R 500 UNIT/ML CONCENTRATED injection      • Insulin Pen Needle (NOVOFINE) 30G X 8 MM misc As directed     • Insulin Regular Human, Conc, (HUMULIN R U-500 KWIKPEN) 500 UNIT/ML solution pen-injector CONCENTRATED injection Inject 150 units three times daily   If pen not covered used vials 3 pen 11   • IPRATROPIUM-ALBUTEROL IN Inhale.     • Lancets (ACCU-CHEK SAFE-T PRO) lancets 1 each by Other route 3  (three) times a day. 23 gauge     • levothyroxine (SYNTHROID, LEVOTHROID) 100 MCG tablet 1 tab PO QAM on empty stomach 1 hour before meals/meds     • lisinopril (PRINIVIL,ZESTRIL) 10 MG tablet 1 tab PO QD for BP & kidney protection     • metFORMIN ER (GLUCOPHAGE-XR) 500 MG 24 hr tablet 2 tablets po  tablet 11   • montelukast (SINGULAIR) 10 MG tablet 1 tab PO QHS for asthma     • omeprazole (PriLOSEC) 20 MG capsule Take 20 mg by mouth 2 (two) times a day.     • ranitidine (ZANTAC) 150 MG tablet Take 150 mg by mouth 2 (two) times a day.     • vitamin D (ERGOCALCIFEROL) 69647 UNITS capsule capsule Take 1 capsule by mouth 1 (one) time per week.     • Vitamin D, Cholecalciferol, 1000 units capsule 1 cap PO 2 x weekly for Vitamin D replacement     • [DISCONTINUED] Dulaglutide (TRULICITY) 1.5 MG/0.5ML solution pen-injector Inject  under the skin.     • [DISCONTINUED] EASY TOUCH PEN NEEDLES 31G X 6 MM misc      • [DISCONTINUED] Insulin Regular Human, Conc, (HUMULIN R U-500 KWIKPEN) 500 UNIT/ML solution pen-injector CONCENTRATED injection Inject 150 units three times daily   If pen not covered used vials 3 pen 11   • [DISCONTINUED] metFORMIN ER (GLUCOPHAGE-XR) 500 MG 24 hr tablet 2 tabs PO BID w breakfast & supper for diabetes       No facility-administered encounter medications on file as of 2/5/2018.        Orders placed during this encounter include:  No orders of the defined types were placed in this encounter.    Glycemic Management        Last HgbA1c 7.2%      patient is testing 4 x daily because patient is on concentrated insulin and needs to make frequent adjustments to dosage            This document has been electronically signed by POORNIMA Jessica on September 28, 2018 9:16 AM         Insulin resistant diabetes with lipodystrophic features      Stop januvia, tresiba, novolog and for now invokana     ---     Keep trulicity and metformin        Start u500 insulin 130 for bkfast and 85 for supper--  now 150 breakfast and 115 for supper ----     Keep 150 for breakfast         Decrease supper to 110 --- decrease to 100        If low sugar , less than 80 at lunch or supper, decrease bkfast dose to 120     If low sugar, less than 80 , at bedtime, or before bkfast, decrease evening dose to 75     ---        If after 1 week the majority of lunch and supper readings are more than 200 - increase bkfast dose by 10     If the majority of supper and before bkfast readings are more than 200 - increase supper dose by 10            Lipid Management               Lab Results   Component Value Date     TRIG 521 (H) 09/08/2015     TRIG 701 (H) 08/31/2015     TRIG 446 (H) 05/26/2015                Lab Results   Component Value Date     HDL 41.0 09/08/2015     HDL 44.0 08/31/2015     HDL 52.0 05/26/2015            Only zetia, intolerant to statins     Blood Pressure Management:          Vitals:     08/14/17 1343   BP: 126/72   Pulse: 118                Lab Results   Component Value Date     GLUCOSE 174 (H) 09/18/2015     CALCIUM 9.1 09/18/2015     .0 09/18/2015     K 3.7 09/18/2015     CO2 19.0 (L) 09/18/2015     .0 09/18/2015     BUN 9 09/18/2015     CREATININE 0.7 09/18/2015     ANIONGAP 13.0 09/18/2015                     Microvascular Complication Monitoring:       Eye Exam Evaluation, within 1 year      -----------     Last Microalbumin-Proteinuria Assessment     No results found for: MALBCRERATIO     No results found for: UTPCR     -----------        Neuropathy, yes         Refer to Neurology      For numbness in bilateral hands                        Weight Related:            Diet interventions: moderate (500 kCal/d) deficit diet.    BMI - 40       Exercise to weight         Bone Health               Lab Results   Component Value Date     CALCIUM 9.1 09/18/2015         Thyroid Health               Lab Results   Component Value Date     TSH 0.89 09/08/2015     TSH 1.90 05/26/2015     TSH 5.86 (H) 05/09/2015                    Lab Results   Component Value Date     FREET4 1.53 09/08/2015     FREET4 1.34 05/26/2015     FREET4 1.62 07/08/2014            Other Diabetes Related Aspects                   Lab Results   Component Value Date     [id number] 203 (L) 09/18/2015         Start b12      Last celiac panel                Lab Results   Component Value Date     GDPABIGA 4 07/08/2014     TTRANSGLIGA <2 07/08/2014            Hand numbness       Refer to neurology     4. Follow-up: Return in about 4 months (around 6/5/2018) for Recheck.

## 2018-03-07 ENCOUNTER — DOCUMENTATION (OUTPATIENT)
Dept: CARDIOLOGY | Facility: CLINIC | Age: 48
End: 2018-03-07

## 2018-03-07 ENCOUNTER — OFFICE VISIT (OUTPATIENT)
Dept: CARDIOLOGY | Facility: CLINIC | Age: 48
End: 2018-03-07

## 2018-03-07 VITALS
HEART RATE: 111 BPM | SYSTOLIC BLOOD PRESSURE: 116 MMHG | DIASTOLIC BLOOD PRESSURE: 72 MMHG | BODY MASS INDEX: 38.71 KG/M2 | HEIGHT: 59 IN | WEIGHT: 192 LBS | OXYGEN SATURATION: 95 %

## 2018-03-07 DIAGNOSIS — E11.9 TYPE 2 DIABETES MELLITUS WITHOUT COMPLICATION, UNSPECIFIED LONG TERM INSULIN USE STATUS: Primary | ICD-10-CM

## 2018-03-07 DIAGNOSIS — I20.9 ANGINA PECTORIS (HCC): ICD-10-CM

## 2018-03-07 DIAGNOSIS — I10 ESSENTIAL HYPERTENSION: ICD-10-CM

## 2018-03-07 DIAGNOSIS — E78.2 MIXED HYPERLIPIDEMIA: ICD-10-CM

## 2018-03-07 PROCEDURE — 99214 OFFICE O/P EST MOD 30 MIN: CPT | Performed by: INTERNAL MEDICINE

## 2018-03-07 RX ORDER — GABAPENTIN 300 MG/1
300 CAPSULE ORAL 2 TIMES DAILY
Refills: 5 | COMMUNITY
Start: 2018-02-28

## 2018-03-07 RX ORDER — FENOFIBRATE 160 MG/1
160 TABLET ORAL DAILY
COMMUNITY
Start: 2018-01-31 | End: 2022-04-18

## 2018-03-07 RX ORDER — ROPINIROLE 2 MG/1
2 TABLET, FILM COATED ORAL
COMMUNITY
Start: 2018-02-28

## 2018-03-07 RX ORDER — NYSTATIN 100000 [USP'U]/G
POWDER TOPICAL
COMMUNITY
Start: 2018-02-26 | End: 2022-09-21

## 2018-03-07 RX ORDER — AZITHROMYCIN 250 MG/1
TABLET, FILM COATED ORAL
COMMUNITY
Start: 2018-03-06 | End: 2018-07-16

## 2018-03-07 RX ORDER — POTASSIUM CHLORIDE 20 MEQ/1
20 TABLET, EXTENDED RELEASE ORAL DAILY
COMMUNITY
Start: 2017-12-13 | End: 2022-09-21

## 2018-03-07 RX ORDER — LANOLIN ALCOHOL/MO/W.PET/CERES
400 CREAM (GRAM) TOPICAL 2 TIMES DAILY
Refills: 6 | COMMUNITY
Start: 2018-02-28 | End: 2022-09-21

## 2018-03-07 RX ORDER — IBUPROFEN 800 MG/1
800 TABLET ORAL DAILY
COMMUNITY
Start: 2018-02-28

## 2018-03-07 NOTE — PROGRESS NOTES
[facility]  OFFICE NOTE    Humberto Colvin  47 y.o. female    03/07/2018  1. Type 2 diabetes mellitus without complication, unspecified long term insulin use status    2. Mixed hyperlipidemia    3. Essential hypertension    4. Angina pectoris        Chief complaint -Atypical type of chest pain      History of present [id number]-old lady who has been a diabetic for a long time had a cardiac catheterization 2013 which showed no obstructive coronary disease still having twitches of pain in the precordium and her sugars are very labile and she is going to go on insulin pump.  And she is seeing Dr. Braulio Alvarado.  She has tachycardia more of sinus tachycardia on Cartia XT. I'm going to do a Lexiscan nuclear stress test to do protocol as she cannot exercise due to neuropathy from diabetes.              Allergies   Allergen Reactions   • Betagen [Povidone Iodine]    • Penicillins Itching   • Celebrex [Celecoxib] Rash         Past Medical History:   Diagnosis Date   • Abdominal pain, epigastric    • Abrasion     an d/or friction burn   • Abrasion     and/or friction burn of hand, INFECTED   • Acquired hypothyroidism    • Acute bronchitis    • Acute hypokalemia    • Acute otitis media     right   • Acute sinusitis    • Acute vaginitis    • Anemia    • Aphthous ulceration     of skin and/or mucous membrane      • Asthma    • Astigmatism    • Backache     chronic   • Benign neoplasm of choroid     OS   • Candidiasis     skin and vagina   • Carpal tunnel syndrome    • Cellulitis of foot    • Cobalamin deficiency    • COLD (chronic obstructive lung disease)    • Cough    • Cyst of Bartholin's gland duct    • Diabetes mellitus     no retinopathy   • Diarrhea    • Disorder     Disorder due to type 2 diabetes mellitus     • Dyslipidemia    • Dysphagia    • Dyspnea    • Dysuria    • Encounter for gynecological examination with abnormal finding    • Encounter for screening mammogram for  malignant neoplasm of breast    • Epigastric pain    • Essential hypertension    • Fatigue    • Gastritis    • Generalized abdominal pain    • GERD (gastroesophageal reflux disease)    • GI allergy to food      cow milk, shrimp, sesame seed   • HA (headache)    • Hashimoto's thyroiditis    • Hearing loss    • Heartburn    • Herpes labialis    • History of chest pain    • Hyperlipidemia    • Hypertensive disorder    • Influenza-like illness    • Insomnia    • Jaw pain    • Maculopapular rash     eruption   • Menopause    • Myopia    • Nasal vestibulitis    • Nausea and vomiting    • Neck pain     chronic   • Need for influenza vaccination    • Neurologic disorder associated with diabetes mellitus    • Neuropathy    • Obesity    • Pain in lower limb    • Pain in wrist    • Patient currently pregnant     - G 2, P 1, 0-1-1   • Peptic stricture of esophagus    • Prescription refill     renewal   • Proteinuria    • Pruritus of vagina    • Restless legs    • Right upper quadrant pain    • Serous otitis media    • Skin lesion    • Sleep apnea    • Stricture of esophagus    • Symptomatic menopausal or female climacteric states    • Tendinitis    • Tobacco use     and exposure   • Type 2 diabetes mellitus    • Upper respiratory infection    • Vomiting          Past Surgical History:   Procedure Laterality Date   • CARDIAC CATHETERIZATION  05/23/2013    No significant epicardial coronary artery disease. Normal left ventricular systolic function.   • CHOLECYSTECTOMY     • COLONOSCOPY  07/18/2014    internal & external hemorrhoids found.   • ENDOSCOPY  10/19/2015    Esophageal stricture was present.Dilatation performed.Normal stomach.Normal duodenum.   • ENDOSCOPY  07/18/2014    Esophageal stricture present. Dilatation performed. Gastritis in stomach. Biospy taken. Normal duodenum.   • EXCISION LESION      Back/Flank Lipoma removed from back   • INJECTION OF MEDICATION  11/09/2015    B12   • INJECTION OF MEDICATION  06/09/2015     Kenalog   • PAP SMEAR  03/30/2011    Negative   • TOTAL ABDOMINAL HYSTERECTOMY WITH SALPINGO OOPHORECTOMY     • VAGINAL DELIVERY      x1         Family History   Problem Relation Age of Onset   • Diabetes Mother    • Hypertension Mother    • Other Mother      respiratory disorder   • Diabetes Father    • Heart disease Father    • Hypertension Father    • Lung cancer Father    • Diabetes Paternal Grandmother    • Hypertension Paternal Grandmother    • Hypertension Paternal Grandfather    • Diabetes Paternal Grandfather    • Stroke Other          Social History     Social History   • Marital status:      Spouse name: N/A   • Number of children: N/A   • Years of education: N/A     Occupational History   • Not on file.     Social History Main Topics   • Smoking status: Former Smoker   • Smokeless tobacco: Never Used   • Alcohol use No   • Drug use: No   • Sexual activity: No     Other Topics Concern   • Not on file     Social History Narrative         Current Outpatient Prescriptions   Medication Sig Dispense Refill   • albuterol (PROAIR HFA) 108 (90 BASE) MCG/ACT inhaler Inhale 2 puffs 4 (four) times a day as needed for wheezing.     • aspirin 81 MG chewable tablet 1 PO QD for cardiovascular protection     • azithromycin (ZITHROMAX) 250 MG tablet      • BD INSULIN SYRINGE U-500 31G X 6MM 0.5 ML misc USE THREE TIMES A DAY WITH INSULIN 100 each 11   • Blood Glucose Monitoring Suppl (FREESTYLE LITE) device      • brompheniramine-pseudoephedrine-DM 30-2-10 MG/5ML syrup Take 5 mL by mouth 4 (Four) Times a Day As Needed for Allergies. 118 mL 0   • calcium carbonate-vitamin d 600-400 MG-UNIT per tablet Take 1 tablet by mouth 2 (Two) Times a Day.  12   • Canagliflozin (INVOKANA) 300 MG tablet Take 300 mg by mouth Every Morning.     • CARTIA  MG 24 hr capsule TAKE ONE CAPSULE BY MOUTH IN THE MORNING 30 capsule 3   • citalopram (CeleXA) 20 MG tablet 1 tab PO QHS for anxiety & depression     • Dulaglutide  (TRULICITY) 1.5 MG/0.5ML solution pen-injector Inject 1.5 mg under the skin 1 (One) Time Per Week. 4 pen 11   • EASY TOUCH PEN NEEDLES 31G X 6 MM misc 3 times daily 150 each 11   • estradiol (ESTRACE) 0.5 MG tablet Take 1 tablet by mouth Daily. 30 tablet 11   • ezetimibe (ZETIA) 10 MG tablet 1 tab PO QD for cholesterol     • fenofibrate 160 MG tablet Take 160 mg by mouth Daily.     • fexofenadine (ALLEGRA) 180 MG tablet 1 tab PO QD prn allergy symptoms     • fluticasone (FLONASE) 50 MCG/ACT nasal spray Instill 2 sprays in each nostril QD for nasal congestion/drainage     • furosemide (LASIX) 40 MG tablet Take 40 mg by mouth Daily.     • gabapentin (NEURONTIN) 300 MG capsule Take 300 mg by mouth 2 (Two) Times a Day.  5   • gemfibrozil (LOPID) 600 MG tablet      • glucose blood (ACCU-CHEK GEOVANNA PLUS) test strip 1 each by Other route 3 (three) times a day. Use as instructed     • HUMULIN R 500 UNIT/ML CONCENTRATED injection      • ibuprofen (ADVIL,MOTRIN) 800 MG tablet Take 800 mg by mouth Daily.     • Insulin Pen Needle (NOVOFINE) 30G X 8 MM misc As directed     • IPRATROPIUM-ALBUTEROL IN Inhale.     • Lancets (ACCU-CHEK SAFE-T PRO) lancets 1 each by Other route 3 (three) times a day. 23 gauge     • levothyroxine (SYNTHROID, LEVOTHROID) 100 MCG tablet 1 tab PO QAM on empty stomach 1 hour before meals/meds     • lisinopril (PRINIVIL,ZESTRIL) 10 MG tablet 1 tab PO QD for BP & kidney protection     • Magnesium Oxide 400 (240 Mg) MG tablet Take 400 mg by mouth 2 (Two) Times a Day.  6   • metFORMIN ER (GLUCOPHAGE-XR) 500 MG 24 hr tablet 2 tablets po  tablet 11   • montelukast (SINGULAIR) 10 MG tablet 1 tab PO QHS for asthma     • nystatin (nystatin) 311368 UNIT/GM powder      • Omega-3 Fatty Acids (FISH OIL PO) Take 1,000 mg by mouth 2 (Two) Times a Day.     • omeprazole (PriLOSEC) 20 MG capsule Take 20 mg by mouth 2 (two) times a day.     • potassium chloride (K-DUR,KLOR-CON) 20 MEQ CR tablet Take 20 mEq by mouth  "Daily.     • ranitidine (ZANTAC) 150 MG tablet Take 150 mg by mouth 2 (two) times a day.     • rOPINIRole (REQUIP) 2 MG tablet Take 2 mg by mouth every night at bedtime.       No current facility-administered medications for this visit.          Review of Systems     Constitution: Denies any fatigue, fever or chills    HENT: Denies any headache, hearing impairment,     Eyes: Denies any blurring of vision, or photophobia     Cardivascular - As per history of present illness     Respiratory system-Shortness of breath NYHA class II   sleep apnea.     Endocrine:   history of hyperlipidemia, diabetes,                             Musculoskeletal:  history of arthritis with musculoskeletal problems    Gastrointestinal: No nausea, vomiting, or melena    Genitourinary: No dysuria or hematuria    Neurological:   No history of seizure disorder, has peripheral neuropathy from diabetes    Psychiatric/Behavioral:        No history of depression,     Hematological- no history of easy bruising             OBJECTIVE    /72 (BP Location: Left arm, Patient Position: Sitting, Cuff Size: Adult)  Pulse 111  Ht 149.9 cm (59\")  Wt 87.1 kg (192 lb)  LMP 10/06/2001 (Approximate)  SpO2 95%  Breastfeeding? No  BMI 38.78 kg/m2      Physical Exam     Constitutional: is oriented to person, place, and time.     Skin-warm and dry    Well developed and nourished in no acute distress      Head: Normocephalic and atraumatic.     Eyes: Pupils are equal,    Neck: Neck supple. No bruit in the carotids    Cardiovascular: [place] in the fifth intercostal space   Regular rate, and  Rhythm,    S1 greater than S2, no S3 or S4, no gallop     Pulmonary/Chest:   Air  Entry is equal on both sides  No wheezing or crackles,      Abdominal: Soft.  No hepatosplenomegaly, bowel sounds are present    Musculoskeletal: No kyphoscoliosis    Neurological: is alert and oriented to person, place, and time.    cranial nerve are intact .   Peripheral neuropathy from " diabetes     Extremities-no edema, no radial femoral delay      Psychiatric: He has a normal mood and affect.                  His behavior is normal.           Procedures                     A/P    Chest pain atypical in nature with risk factors of diabetes, obesity and hyperlipidemia will schedule for to do protocol Lexiscan nuclear stress test as she cannot exercise due to peripheral neuropathy and arthritis.    Diabetes on insulin pump, invokana, trulicity    Hyperlipidemia on Zetia and fenofibrate, she is on Synthroid supplements for hypothyroidism.    Hypertension controlled with lisinopril and Cartia XT.    Follow-up in 6 months           May 15, 2018-    patient has no change in symptoms, schedule for a Lexiscan nuclear stress test as patient cannot exercise.                  This document has been electronically signed by Scooter Pelaez MD on March 7, 2018 3:05 PM       EMR Dragon/Transcription disclaimer:   Some of this note may be an electronic transcription/translation of spoken language to printed text. The electronic translation of spoken language may permit erroneous, or at times, nonsensical words or phrases to be inadvertently transcribed; Although I have reviewed the note for such errors, some may still exist.

## 2018-03-28 ENCOUNTER — DOCUMENTATION (OUTPATIENT)
Dept: CARDIOLOGY | Facility: CLINIC | Age: 48
End: 2018-03-28

## 2018-03-28 ENCOUNTER — APPOINTMENT (OUTPATIENT)
Dept: NUCLEAR MEDICINE | Facility: HOSPITAL | Age: 48
End: 2018-03-28

## 2018-03-29 ENCOUNTER — APPOINTMENT (OUTPATIENT)
Dept: NUCLEAR MEDICINE | Facility: HOSPITAL | Age: 48
End: 2018-03-29

## 2018-03-29 ENCOUNTER — APPOINTMENT (OUTPATIENT)
Dept: CARDIOLOGY | Facility: HOSPITAL | Age: 48
End: 2018-03-29

## 2018-04-10 RX ORDER — DILTIAZEM HYDROCHLORIDE 180 MG/1
CAPSULE, EXTENDED RELEASE ORAL
Qty: 30 CAPSULE | Refills: 9 | Status: SHIPPED | OUTPATIENT
Start: 2018-04-10 | End: 2019-01-28 | Stop reason: SDUPTHER

## 2018-04-18 ENCOUNTER — DOCUMENTATION (OUTPATIENT)
Dept: CARDIOLOGY | Facility: CLINIC | Age: 48
End: 2018-04-18

## 2018-04-18 RX ORDER — FLUCONAZOLE 150 MG/1
150 TABLET ORAL ONCE
Qty: 2 TABLET | Refills: 0 | Status: SHIPPED | OUTPATIENT
Start: 2018-04-18 | End: 2018-04-18

## 2018-04-18 NOTE — PROGRESS NOTES
Pt called with S/S of yeast infection. She has had one previously. I have no available appointments until next week. I will treat empirically with Diflucan but if symptoms persist, worsen, or change, she needs to follow up in office.

## 2018-04-19 ENCOUNTER — APPOINTMENT (OUTPATIENT)
Dept: NUCLEAR MEDICINE | Facility: HOSPITAL | Age: 48
End: 2018-04-19

## 2018-04-20 ENCOUNTER — APPOINTMENT (OUTPATIENT)
Dept: NUCLEAR MEDICINE | Facility: HOSPITAL | Age: 48
End: 2018-04-20

## 2018-04-20 ENCOUNTER — APPOINTMENT (OUTPATIENT)
Dept: CARDIOLOGY | Facility: HOSPITAL | Age: 48
End: 2018-04-20

## 2018-05-16 ENCOUNTER — TELEPHONE (OUTPATIENT)
Dept: GENERAL RADIOLOGY | Facility: HOSPITAL | Age: 48
End: 2018-05-16

## 2018-05-22 RX ORDER — CLOTRIMAZOLE 1 %
CREAM WITH APPLICATOR VAGINAL NIGHTLY
Qty: 45 G | Refills: 0 | Status: SHIPPED | OUTPATIENT
Start: 2018-05-22 | End: 2018-07-16

## 2018-05-22 RX ORDER — FLUCONAZOLE 150 MG/1
150 TABLET ORAL DAILY
Qty: 2 TABLET | Refills: 0 | Status: SHIPPED | OUTPATIENT
Start: 2018-05-22 | End: 2018-08-10 | Stop reason: SDUPTHER

## 2018-05-22 NOTE — PROGRESS NOTES
Pt has recently been on two antibiotics prescribed by a different provider. She is now having vaginal itching and discharge consistent with yeast infection. Will treat with both oral and topical antifungals. If symptoms persist or worsen, pt must RTC for evaluation.

## 2018-08-10 RX ORDER — FLUCONAZOLE 150 MG/1
150 TABLET ORAL DAILY
Qty: 2 TABLET | Refills: 0 | Status: SHIPPED | OUTPATIENT
Start: 2018-08-10 | End: 2018-10-08 | Stop reason: SDUPTHER

## 2018-08-10 NOTE — PROGRESS NOTES
Pt called complaining of yeast infection symptoms again after antibiotics for pneumonia 3 weeks ago. She did not take anything for yeast. I have encouraged her to request Diflucan with every antibiotic prescription. I will only send 2 diflucan, then pt will have to come in if symptoms persist. She was told that she needs to call before 3PM on Friday's so that we can get her in to be seen.

## 2018-08-14 RX ORDER — INSULIN HUMAN 500 [IU]/ML
INJECTION, SOLUTION SUBCUTANEOUS
Qty: 20 ML | Refills: 11 | Status: SHIPPED | OUTPATIENT
Start: 2018-08-14 | End: 2019-01-28 | Stop reason: SDUPTHER

## 2018-08-21 ENCOUNTER — APPOINTMENT (OUTPATIENT)
Dept: NUCLEAR MEDICINE | Facility: HOSPITAL | Age: 48
End: 2018-08-21
Attending: INTERNAL MEDICINE

## 2018-08-22 ENCOUNTER — APPOINTMENT (OUTPATIENT)
Dept: NUCLEAR MEDICINE | Facility: HOSPITAL | Age: 48
End: 2018-08-22
Attending: INTERNAL MEDICINE

## 2018-08-22 ENCOUNTER — APPOINTMENT (OUTPATIENT)
Dept: CARDIOLOGY | Facility: HOSPITAL | Age: 48
End: 2018-08-22
Attending: INTERNAL MEDICINE

## 2018-09-07 ENCOUNTER — OFFICE VISIT (OUTPATIENT)
Dept: CARDIOLOGY | Facility: CLINIC | Age: 48
End: 2018-09-07

## 2018-09-07 VITALS
OXYGEN SATURATION: 98 % | HEART RATE: 95 BPM | BODY MASS INDEX: 40.32 KG/M2 | SYSTOLIC BLOOD PRESSURE: 120 MMHG | HEIGHT: 59 IN | DIASTOLIC BLOOD PRESSURE: 70 MMHG | WEIGHT: 200 LBS

## 2018-09-07 DIAGNOSIS — E78.2 MIXED DIABETIC HYPERLIPIDEMIA ASSOCIATED WITH TYPE 2 DIABETES MELLITUS (HCC): Primary | ICD-10-CM

## 2018-09-07 DIAGNOSIS — I15.2 HYPERTENSION ASSOCIATED WITH DIABETES (HCC): ICD-10-CM

## 2018-09-07 DIAGNOSIS — I20.9 ANGINA PECTORIS (HCC): ICD-10-CM

## 2018-09-07 DIAGNOSIS — E11.69 MIXED DIABETIC HYPERLIPIDEMIA ASSOCIATED WITH TYPE 2 DIABETES MELLITUS (HCC): Primary | ICD-10-CM

## 2018-09-07 DIAGNOSIS — E78.2 MIXED HYPERLIPIDEMIA: ICD-10-CM

## 2018-09-07 DIAGNOSIS — E11.59 HYPERTENSION ASSOCIATED WITH DIABETES (HCC): ICD-10-CM

## 2018-09-07 DIAGNOSIS — I10 ESSENTIAL HYPERTENSION: ICD-10-CM

## 2018-09-07 PROCEDURE — 99214 OFFICE O/P EST MOD 30 MIN: CPT | Performed by: INTERNAL MEDICINE

## 2018-09-07 NOTE — PROGRESS NOTES
[facility]  OFFICE NOTE    Humberto Colvin  47 y.o. female    09/07/2018  1. Mixed diabetic hyperlipidemia associated with type 2 diabetes mellitus (CMS/HCC)    2. Hypertension associated with diabetes (CMS/HCC)    3. Mixed hyperlipidemia    4. Angina pectoris (CMS/HCC)    5. Essential hypertension        Chief complaint -Atypical type of chest pain      History of present [id number]-old lady who has been a diabetic for a long time had a cardiac catheterization 2013 which showed no obstructive coronary disease still having twitches of pain in the precordium and her sugars are very labile and she is going to go on insulin pump.  And she is seeing Dr. Braulio Alvarado.  She has tachycardia more of sinus tachycardia on Cartia XT.  She was scheduled for Lexiscan in March 2018 and the insurance did not approve it and she still trying to get it approved through her health care insurance            Allergies   Allergen Reactions   • Betagen [Povidone Iodine]    • Penicillins Itching   • Celebrex [Celecoxib] Rash         Past Medical History:   Diagnosis Date   • Abdominal pain, epigastric    • Abrasion     an d/or friction burn   • Abrasion     and/or friction burn of hand, INFECTED   • Acquired hypothyroidism    • Acute bronchitis    • Acute hypokalemia    • Acute otitis media     right   • Acute sinusitis    • Acute vaginitis    • Anemia    • Aphthous ulceration     of skin and/or mucous membrane      • Asthma    • Astigmatism    • Backache     chronic   • Benign neoplasm of choroid     OS   • Candidiasis     skin and vagina   • Carpal tunnel syndrome    • Cellulitis of foot    • Cobalamin deficiency    • COLD (chronic obstructive lung disease) (CMS/HCC)    • Cough    • Cyst of Bartholin's gland duct    • Diabetes mellitus (CMS/HCC)     no retinopathy   • Diarrhea    • Disorder     Disorder due to type 2 diabetes mellitus     • Dyslipidemia    • Dysphagia    • Dyspnea    • Dysuria     • Encounter for gynecological examination with abnormal finding    • Encounter for screening mammogram for malignant neoplasm of breast    • Epigastric pain    • Essential hypertension    • Fatigue    • Gastritis    • Generalized abdominal pain    • GERD (gastroesophageal reflux disease)    • GI allergy to food      cow milk, shrimp, sesame seed   • HA (headache)    • Hashimoto's thyroiditis    • Hearing loss    • Heartburn    • Herpes labialis    • History of chest pain    • Hyperlipidemia    • Hypertensive disorder    • Influenza-like illness    • Insomnia    • Jaw pain    • Maculopapular rash     eruption   • Menopause    • Myopia    • Nasal vestibulitis    • Nausea and vomiting    • Neck pain     chronic   • Need for influenza vaccination    • Neurologic disorder associated with diabetes mellitus (CMS/HCC)    • Neuropathy    • Obesity    • Pain in lower limb    • Pain in wrist    • Patient currently pregnant     - G 2, P 1, 0-1-1   • Peptic stricture of esophagus    • Prescription refill     renewal   • Proteinuria    • Pruritus of vagina    • Restless legs    • Right upper quadrant pain    • Serous otitis media    • Skin lesion    • Sleep apnea    • Stricture of esophagus    • Symptomatic menopausal or female climacteric states    • Tendinitis    • Tobacco use     and exposure   • Type 2 diabetes mellitus (CMS/HCC)    • Upper respiratory infection    • Vomiting          Past Surgical History:   Procedure Laterality Date   • CARDIAC CATHETERIZATION  05/23/2013    No significant epicardial coronary artery disease. Normal left ventricular systolic function.   • CHOLECYSTECTOMY     • COLONOSCOPY  07/18/2014    internal & external hemorrhoids found.   • ENDOSCOPY  10/19/2015    Esophageal stricture was present.Dilatation performed.Normal stomach.Normal duodenum.   • ENDOSCOPY  07/18/2014    Esophageal stricture present. Dilatation performed. Gastritis in stomach. Biospy taken. Normal duodenum.   • EXCISION LESION       Back/Flank Lipoma removed from back   • INJECTION OF MEDICATION  11/09/2015    B12   • INJECTION OF MEDICATION  06/09/2015    Kenalog   • PAP SMEAR  03/30/2011    Negative   • TOTAL ABDOMINAL HYSTERECTOMY WITH SALPINGO OOPHORECTOMY     • VAGINAL DELIVERY      x1         Family History   Problem Relation Age of Onset   • Diabetes Mother    • Hypertension Mother    • Other Mother         respiratory disorder   • Diabetes Father    • Heart disease Father    • Hypertension Father    • Lung cancer Father    • Diabetes Paternal Grandmother    • Hypertension Paternal Grandmother    • Hypertension Paternal Grandfather    • Diabetes Paternal Grandfather    • Stroke Other          Social History     Social History   • Marital status:      Spouse name: N/A   • Number of children: N/A   • Years of education: N/A     Occupational History   • Not on file.     Social History Main Topics   • Smoking status: Former Smoker   • Smokeless tobacco: Never Used   • Alcohol use No   • Drug use: No   • Sexual activity: No     Other Topics Concern   • Not on file     Social History Narrative   • No narrative on file         Current Outpatient Prescriptions   Medication Sig Dispense Refill   • albuterol (PROAIR HFA) 108 (90 BASE) MCG/ACT inhaler Inhale 2 puffs 4 (four) times a day as needed for wheezing.     • aspirin 81 MG chewable tablet 1 PO QD for cardiovascular protection     • BD INSULIN SYRINGE U-500 31G X 6MM 0.5 ML misc USE THREE TIMES A DAY WITH INSULIN 100 each 11   • Blood Glucose Monitoring Suppl (FREESTYLE LITE) device      • calcium carbonate-vitamin d 600-400 MG-UNIT per tablet Take 1 tablet by mouth 2 (Two) Times a Day.  12   • Canagliflozin (INVOKANA) 300 MG tablet Take 300 mg by mouth Every Morning.     • CARTIA  MG 24 hr capsule TAKE ONE CAPSULE BY MOUTH IN THE MORNING 30 capsule 9   • citalopram (CeleXA) 20 MG tablet 1 tab PO QHS for anxiety & depression     • Dulaglutide (TRULICITY) 1.5 MG/0.5ML  solution pen-injector Inject 1.5 mg under the skin 1 (One) Time Per Week. 4 pen 11   • EASY TOUCH PEN NEEDLES 31G X 6 MM misc 3 times daily 150 each 11   • estradiol (ESTRACE) 0.5 MG tablet Take 1 tablet by mouth Daily. 30 tablet 11   • ezetimibe (ZETIA) 10 MG tablet 1 tab PO QD for cholesterol     • fenofibrate 160 MG tablet Take 160 mg by mouth Daily.     • fexofenadine (ALLEGRA) 180 MG tablet 1 tab PO QD prn allergy symptoms     • fluconazole (DIFLUCAN) 150 MG tablet Take 1 tablet by mouth Daily. Repeat dose in 72 hours 2 tablet 0   • fluticasone (FLONASE) 50 MCG/ACT nasal spray Instill 2 sprays in each nostril QD for nasal congestion/drainage     • furosemide (LASIX) 40 MG tablet Take 40 mg by mouth Daily.     • gabapentin (NEURONTIN) 300 MG capsule Take 300 mg by mouth 2 (Two) Times a Day.  5   • gemfibrozil (LOPID) 600 MG tablet      • glucose blood (ACCU-CHEK GEOVANNA PLUS) test strip 1 each by Other route 3 (three) times a day. Use as instructed     • HUMULIN R 500 UNIT/ML CONCENTRATED injection INJECT 130 UNITS UNDER THE SKIN AT BREAKFAST AND 85 UNITS AT SUPPER, THEN AS DIRECTED PER SLIDING SCALE BY  20 mL 11   • ibuprofen (ADVIL,MOTRIN) 800 MG tablet Take 800 mg by mouth Daily.     • Insulin Pen Needle (NOVOFINE) 30G X 8 MM misc As directed     • IPRATROPIUM-ALBUTEROL IN Inhale.     • Lancets (ACCU-CHEK SAFE-T PRO) lancets 1 each by Other route 3 (three) times a day. 23 gauge     • levothyroxine (SYNTHROID, LEVOTHROID) 100 MCG tablet 1 tab PO QAM on empty stomach 1 hour before meals/meds     • lisinopril (PRINIVIL,ZESTRIL) 10 MG tablet 1 tab PO QD for BP & kidney protection     • Magnesium Oxide 400 (240 Mg) MG tablet Take 400 mg by mouth 2 (Two) Times a Day.  6   • metFORMIN ER (GLUCOPHAGE-XR) 500 MG 24 hr tablet 2 tablets po  tablet 11   • montelukast (SINGULAIR) 10 MG tablet 1 tab PO QHS for asthma     • nystatin (nystatin) 547232 UNIT/GM powder      • Omega-3 Fatty Acids (FISH OIL PO) Take 1,000  "mg by mouth 2 (Two) Times a Day.     • omeprazole (PriLOSEC) 20 MG capsule Take 20 mg by mouth 2 (two) times a day.     • potassium chloride (K-DUR,KLOR-CON) 20 MEQ CR tablet Take 20 mEq by mouth Daily.     • ranitidine (ZANTAC) 150 MG tablet Take 150 mg by mouth 2 (two) times a day.     • rOPINIRole (REQUIP) 2 MG tablet Take 2 mg by mouth every night at bedtime.     • promethazine-dextromethorphan (PROMETHAZINE-DM) 6.25-15 MG/5ML syrup Take 5 mL by mouth 4 (Four) Times a Day As Needed for Cough. 118 mL 0     No current facility-administered medications for this visit.          Review of Systems     Constitution: Denies any fatigue, fever or chills    HENT: Denies any headache, hearing impairment,     Eyes: Denies any blurring of vision, or photophobia     Cardivascular - As per history of present illness     Respiratory system-Shortness of breath NYHA class II   sleep apnea.     Endocrine:   history of hyperlipidemia, diabetes,                             Musculoskeletal:  history of arthritis with musculoskeletal problems    Gastrointestinal: No nausea, vomiting, or melena    Genitourinary: No dysuria or hematuria    Neurological:   No history of seizure disorder, has peripheral neuropathy from diabetes    Psychiatric/Behavioral:        No history of depression,     Hematological- no history of easy bruising             OBJECTIVE    /70   Pulse 95   Ht 149.9 cm (59.02\")   Wt 90.7 kg (200 lb)   LMP 10/06/2001 (Approximate)   SpO2 98%   BMI 40.37 kg/m²       Physical Exam     Constitutional: is oriented to person, place, and time.     Skin-warm and dry    Well developed and nourished in no acute distress      Head: Normocephalic and atraumatic.     Eyes: Pupils are equal,    Neck: Neck supple. No bruit in the carotids    Cardiovascular: [place] in the fifth intercostal space   Regular rate, and  Rhythm,    S1 greater than S2, no S3 or S4, no gallop     Pulmonary/Chest:   Air  Entry is equal on both " sides  No wheezing or crackles,      Abdominal: Soft.  No hepatosplenomegaly, bowel sounds are present    Musculoskeletal: No kyphoscoliosis    Neurological: is alert and oriented to person, place, and time.    cranial nerve are intact .   Peripheral neuropathy from diabetes     Extremities-no edema, no radial femoral delay      Psychiatric: He has a normal mood and affect.                  His behavior is normal.           Procedures                     A/P    Chest pain atypical in nature with risk factors of diabetes, obesity and hyperlipidemia patient has chronic chest pain had a normal catheter in 2013 insurance won't cover for a nuclear stress test that was denied and the patient is appealing to it.  At this point there are no other options will continue medical therapy until patient gets the insurance to approve it    Diabetes on insulin pump, invokana, trulicity    Hyperlipidemia on Zetia and fenofibrate, she is on Synthroid supplements for hypothyroidism.    Hypertension controlled with lisinopril and Cartia XT.    Follow-up in 6 months           .                  This document has been electronically signed by Scooter Pelaez MD on September 7, 2018 11:26 AM       EMR Dragon/Transcription disclaimer:   Some of this note may be an electronic transcription/translation of spoken language to printed text. The electronic translation of spoken language may permit erroneous, or at times, nonsensical words or phrases to be inadvertently transcribed; Although I have reviewed the note for such errors, some may still exist.

## 2018-10-08 ENCOUNTER — OFFICE VISIT (OUTPATIENT)
Dept: OBSTETRICS AND GYNECOLOGY | Facility: CLINIC | Age: 48
End: 2018-10-08

## 2018-10-08 VITALS
HEART RATE: 100 BPM | WEIGHT: 195 LBS | RESPIRATION RATE: 16 BRPM | SYSTOLIC BLOOD PRESSURE: 120 MMHG | DIASTOLIC BLOOD PRESSURE: 76 MMHG | BODY MASS INDEX: 39.36 KG/M2 | OXYGEN SATURATION: 98 %

## 2018-10-08 DIAGNOSIS — Z12.31 SCREENING MAMMOGRAM, ENCOUNTER FOR: ICD-10-CM

## 2018-10-08 DIAGNOSIS — Z12.72 VAGINAL PAP SMEAR: Primary | ICD-10-CM

## 2018-10-08 DIAGNOSIS — R30.0 DYSURIA: ICD-10-CM

## 2018-10-08 DIAGNOSIS — B37.31 VAGINAL YEAST INFECTION: ICD-10-CM

## 2018-10-08 DIAGNOSIS — Z79.890 ON POSTMENOPAUSAL HORMONE REPLACEMENT THERAPY: ICD-10-CM

## 2018-10-08 LAB
BACTERIA UR QL AUTO: ABNORMAL /HPF
BILIRUB UR QL STRIP: NEGATIVE
CLARITY UR: ABNORMAL
COLOR UR: ABNORMAL
GLUCOSE UR STRIP-MCNC: ABNORMAL MG/DL
HGB UR QL STRIP.AUTO: ABNORMAL
HYALINE CASTS UR QL AUTO: ABNORMAL /LPF
KETONES UR QL STRIP: NEGATIVE
LEUKOCYTE ESTERASE UR QL STRIP.AUTO: NEGATIVE
NITRITE UR QL STRIP: NEGATIVE
OTHER OBSERVATIONS IN URINE MICRO: ABNORMAL /HPF
PH UR STRIP.AUTO: 6 [PH] (ref 5.5–8)
PROT UR QL STRIP: NEGATIVE
RBC # UR: ABNORMAL /HPF
REF LAB TEST METHOD: ABNORMAL
SP GR UR STRIP: 1.01 (ref 1–1.03)
SQUAMOUS #/AREA URNS HPF: ABNORMAL /HPF
UROBILINOGEN UR QL STRIP: ABNORMAL
WBC UR QL AUTO: ABNORMAL /HPF
YEAST URNS QL MICRO: ABNORMAL /HPF

## 2018-10-08 PROCEDURE — 81001 URINALYSIS AUTO W/SCOPE: CPT | Performed by: NURSE PRACTITIONER

## 2018-10-08 PROCEDURE — 87210 SMEAR WET MOUNT SALINE/INK: CPT | Performed by: NURSE PRACTITIONER

## 2018-10-08 PROCEDURE — 87086 URINE CULTURE/COLONY COUNT: CPT | Performed by: NURSE PRACTITIONER

## 2018-10-08 PROCEDURE — 99396 PREV VISIT EST AGE 40-64: CPT | Performed by: NURSE PRACTITIONER

## 2018-10-08 PROCEDURE — G0123 SCREEN CERV/VAG THIN LAYER: HCPCS | Performed by: NURSE PRACTITIONER

## 2018-10-08 PROCEDURE — 88141 CYTOPATH C/V INTERPRET: CPT | Performed by: PATHOLOGY

## 2018-10-08 RX ORDER — FLUCONAZOLE 150 MG/1
150 TABLET ORAL DAILY
Qty: 3 TABLET | Refills: 0 | Status: SHIPPED | OUTPATIENT
Start: 2018-10-08

## 2018-10-08 RX ORDER — ESTRADIOL 1 MG/1
1 TABLET ORAL DAILY
Qty: 30 TABLET | Refills: 11 | Status: SHIPPED | OUTPATIENT
Start: 2018-10-08 | End: 2019-10-21 | Stop reason: SDUPTHER

## 2018-10-08 RX ORDER — GUAIFENESIN AND DEXTROMETHORPHAN HYDROBROMIDE 600; 30 MG/1; MG/1
1 TABLET, EXTENDED RELEASE ORAL
COMMUNITY
Start: 2018-10-05 | End: 2018-10-16

## 2018-10-08 NOTE — PROGRESS NOTES
"Subjective   Chief Complaint   Patient presents with   • Gynecologic Exam     well woman, pt states she is itching and it burns during urination     Humberto Colvin is a 48 y.o. year old  presenting to be seen for her annual exam.      She {IS/is not:38292::\"is\"} sexually active.  In the past 12 months there {Action - have/HAVE NOT:85686::\"has not\"} been new sexual partners.  Condoms {are/ARE NOT:39133::\"are not\"} typically used.  She {would/WOULD NOT:::\"would not\"} like to be screened for STD's at today's exam.     She exercises regularly: {Responses; YES/no/not asked:00774::\"yes\"}.  She wears her seat belt:{Responses; YES/no/not asked:12586::\"yes\"}.  She has concerns about domestic violence: {Responses; yes/NO/not asked:::\"no\"}.  She is taking Vit D and Calcium:{Responses; YES/no/not asked:::\"yes\"}  Last colonoscopy: ***  Last DEXA: ***  Last MMG:   Last PAP:  Hx of abnormal pap:    {Yes/No/WC:11344}  {Blank multiple:04744}  LMP: ***    OB History      Para Term  AB Living    2 1     1      SAB TAB Ectopic Molar Multiple Live Births    1                    {Also Blocks:85289}    The following portions of the patient's history were reviewed and updated as appropriate:{Misc - History reviewed:09990::\"problem list\",\"current medications\",\"allergies\",\"past family history\",\"past medical history\",\"past social history\",\"past surgical history\"}.    Smoking status: Former Smoker                                                              Packs/day: 0.00      Years: 0.00      Smokeless tobacco: Never Used                        Review of Systems      Objective   /76   Pulse 100   Resp 16   Wt 88.5 kg (195 lb)   LMP 10/06/2001 (Approximate)   SpO2 98%   BMI 39.36 kg/m²     General:  {Exam - general findings:86763::\"well developed; well nourished\",\"no acute distress\"}   Skin:  {Exam - skin:52867::\"No suspicious lesions seen\"}   Cardiac: {CARDIAC EXAM:75309}   Resp:  " "{RESPIRATORY SYSTEM EXAM:52847}   Thyroid: {Exam - thyroid:64452::\"normal to inspection and palpation\"}   Breasts:  {Exam - breasts:38241::\"Examined in supine position\",\"Symmetric without masses or skin dimpling\",\"Nipples normal without inversion, lesions or discharge\",\"There are no palpable axillary nodes\"}   Abdomen: {Exam - abdomen:74346::\"soft, non-tender; no masses\",\"no umbilical or inginual hernias are present\",\"no hepato-splenomegaly\"}   Psych: {PSYCH EXAM:40647}   Pelvis: {Exam; GYN pelvic:98694::\"Clinical staff was present for exam\"}     Lab Review   {Review - gyn labs:45196::\"No data reviewed\"}    Imaging  {Review - imagin::\"No data reviewed\"}       Diagnoses and all orders for this visit:    Vaginal Pap smear  -     Liquid-based Pap Smear, Screening    Vaginal yeast infection  -     fluconazole (DIFLUCAN) 150 MG tablet; Take 1 tablet by mouth Daily. Repeat dose in 72 hours.    On postmenopausal hormone replacement therapy  -     estradiol (ESTRACE) 1 MG tablet; Take 1 tablet by mouth Daily.    Screening mammogram, encounter for  -     Mammo Screening Bilateral With CAD; Future    Dysuria  -     Urinalysis With Culture If Indicated - Urine, Clean Catch  -     Urinalysis, Microscopic Only - Urine, Clean Catch; Future  -     Urinalysis, Microscopic Only - Urine, Clean Catch  -     Urine Culture - Urine,; Future  -     Urine Culture - Urine,    Other orders  -     guaifenesin-dextromethorphan (MUCINEX DM)  MG tablet sustained-release 12 hour tablet; Take 1 tablet by mouth.        This note was electronically signed.    Tracie Wiley, APRN  2018  "

## 2018-10-08 NOTE — PROGRESS NOTES
"Subjective   Chief Complaint   Patient presents with   • Gynecologic Exam     well woman, pt states she is itching and it burns during urination     Humberto Colvin is a 48 y.o. year old  presenting to be seen for her annual exam.  She states she has noticed vaginal itching lately, and \"burning\" with urination. She is a diabetic and had rocephin injection. Has recurrent yeast infections.     She also states estradiol 0.5mg may not be strong enough. Complains of some hot flashes still and vaginal dryness. Tried estrace but it was too expensive and cannot afford compound. We will try increasing to 1 mg but due to comorbidities I will not increase any more than that. AND I spent time discussing risks including MI, stroke, DVT and cancers. She voices understanding. If cardiology wants her to stop taking, she will need to discontinue immediately.      She is not sexually active.  In the past 12 months there has not been new sexual partners.  Condoms are not typically used.  She would not like to be screened for STD's at today's exam.     She exercises regularly: no. Trying to lose weight with dieting. Down 5lbs in 1 month.   She wears her seat belt:yes.  She has concerns about domestic violence: no.  She is taking Vit D and Calcium:yes  Last colonoscopy:   Last DEXA:   Last MM17  Last PAP: 10/6/17  Hx of abnormal pap: Yes, requiring Hysterectomy w/BSO in . High grade lesion ANTHONY 3      SURGICAL MENOPAUSE  LMP:     OB History      Para Term  AB Living    2 1     1      SAB TAB Ectopic Molar Multiple Live Births    1                    .Smoking status: Former Smoker                                                              Packs/day: 0.00      Years: 0.00      Smokeless tobacco: Never Used                        Review of Systems   Constitutional: Negative.  Negative for chills and fever.   HENT: Positive for congestion.    Respiratory: Negative.         Asthma well " controlled at this time   Cardiovascular: Negative.    Gastrointestinal: Negative for abdominal pain, constipation, diarrhea and rectal pain.   Endocrine: Positive for heat intolerance (hot flashes).        Diabetic   Genitourinary: Positive for vaginal pain (dryness and itching). Negative for dysuria, frequency, genital sores, hematuria, pelvic pain, urgency and vaginal discharge.   Skin: Negative.  Negative for rash.   Neurological: Negative for dizziness, syncope, light-headedness and headaches.   Psychiatric/Behavioral: Positive for agitation. Negative for suicidal ideas. The patient is not nervous/anxious.          Objective   /76   Pulse 100   Resp 16   Wt 88.5 kg (195 lb)   LMP 10/06/2001 (Approximate)   SpO2 98%   BMI 39.36 kg/m²     General:  well developed; well nourished  no acute distress  obese - Body mass index is 39.36 kg/m².   Skin:  No suspicious lesions seen   Cardiac: Heart sounds are normal.  Regular rate and rhythm without murmur, gallop or rub.   Resp:  Normal expansion.  Clear to auscultation.  No rales, rhonchi, or wheezing.   Thyroid: not examined   Breasts:  Examined in supine position  Symmetric without masses or skin dimpling  Nipples normal without inversion, lesions or discharge  There are no palpable axillary nodes   Abdomen: soft, non-tender; no masses  no umbilical or inginual hernias are present  no hepato-splenomegaly  Normal findings: bowel sounds normal   Psych: alert,oriented, in NAD with a full range of affect, normal behavior and no psychotic features   Pelvis: Clinical staff was present for exam  External genitalia:  normal appearance of the external genitalia including Bartholin's and [place]'s glands. atrophy and irritation noted inside the labia majora  :  urethral meatus normal;  Vaginal:  atrophic mucosal changes are present; discharge present -  white and thin; wet prep done: clue cells are absent, pseudo-hyphae are present, trichomonads are absent and  excess WBC's are absent;  Cervix:  absent.  Uterus:  absent.  Adnexa:  absent, bilateral.  Rectal:  digital rectal exam not performed; anus visually normal appearing. external hemorrhoids present;     Lab Review   UA   Brief Urine Lab Results  (Last result in the past 365 days)      Color   Clarity   Blood   Leuk Est   Nitrite   Protein   CREAT   Urine HCG        10/08/18 0909 Straw Slightly Cloudy(A) Trace(A) Negative Negative Negative               Imaging  Mamm[facility] report from [facility] scanned to chart         Humberto was seen today for gynecologic exam.    Diagnoses and all orders for this visit:    Vaginal Pap smear  -     Liquid-based Pap Smear, Screening. RTC annually for well woman exams    Vaginal yeast infection  -     fluconazole (DIFLUCAN) 150 MG tablet; Take 1 tablet by mouth Daily. Repeat dose in 72 hours.  Take 3 doses due to severity. I strongly urged pt to ask every provider that gives her antibiotics to also prescribe Diflucan and take during antibiotics to prevent fungal infection.     On postmenopausal hormone replacement therapy  -     estradiol (ESTRACE) 1 MG tablet; Take 1 tablet by mouth Daily.  See HPI. Risks discussed. I will trial at 1 mg but if not improving, pt will need to lower again. She has many co morbidities placing her at risk for cardiovascular event.     Screening mammogram, encounter for  -     Mammo Screening Bilateral With CAD; Future  Pt wishes to have at external facility. Order given and faxed to [facility].     Dysuria  -     Urinalysis With Culture If Indicated - Urine, Clean Catch  -     Urinalysis, Microscopic Only - Urine, Clean Catch; Future  -     Urinalysis, Microscopic Only - Urine, Clean Catch  -     Urine Culture - Urine,; Future  -     Urine Culture - Urine,  UA negative. Showed yeast.     This note was electronically signed.    Tracie Wiley, APRN  October 8, 2018

## 2018-10-09 LAB — BACTERIA SPEC AEROBE CULT: NORMAL

## 2018-10-22 LAB
GEN CATEG CVX/VAG CYTO-IMP: NORMAL
LAB AP CASE REPORT: NORMAL
LAB AP GYN ADDITIONAL INFORMATION: NORMAL
LAB AP GYN OTHER FINDINGS: NORMAL
PATH INTERP SPEC-IMP: NORMAL
STAT OF ADQ CVX/VAG CYTO-IMP: NORMAL

## 2019-01-28 DIAGNOSIS — Z79.890 ON POSTMENOPAUSAL HORMONE REPLACEMENT THERAPY: ICD-10-CM

## 2019-01-28 RX ORDER — DILTIAZEM HYDROCHLORIDE 180 MG/1
CAPSULE, EXTENDED RELEASE ORAL
Qty: 30 CAPSULE | Refills: 6 | Status: SHIPPED | OUTPATIENT
Start: 2019-01-28 | End: 2019-09-16 | Stop reason: SDUPTHER

## 2019-01-28 RX ORDER — INSULIN HUMAN 500 [IU]/ML
INJECTION, SOLUTION SUBCUTANEOUS
Qty: 20 ML | Refills: 2 | Status: SHIPPED | OUTPATIENT
Start: 2019-01-28 | End: 2019-04-18 | Stop reason: SDUPTHER

## 2019-01-28 RX ORDER — DULAGLUTIDE 1.5 MG/.5ML
INJECTION, SOLUTION SUBCUTANEOUS
Qty: 2 ML | Refills: 2 | Status: SHIPPED | OUTPATIENT
Start: 2019-01-28 | End: 2019-04-15 | Stop reason: SDUPTHER

## 2019-01-28 RX ORDER — METFORMIN HYDROCHLORIDE 500 MG/1
TABLET, EXTENDED RELEASE ORAL
Qty: 120 TABLET | Refills: 2 | Status: SHIPPED | OUTPATIENT
Start: 2019-01-28 | End: 2019-04-15 | Stop reason: SDUPTHER

## 2019-01-28 RX ORDER — ESTRADIOL 1 MG/1
1 TABLET ORAL DAILY
Qty: 30 TABLET | Refills: 11 | OUTPATIENT
Start: 2019-01-28

## 2019-04-15 RX ORDER — METFORMIN HYDROCHLORIDE 500 MG/1
TABLET, EXTENDED RELEASE ORAL
Qty: 120 TABLET | Refills: 2 | Status: SHIPPED | OUTPATIENT
Start: 2019-04-15 | End: 2022-04-18

## 2019-04-15 RX ORDER — DULAGLUTIDE 1.5 MG/.5ML
INJECTION, SOLUTION SUBCUTANEOUS
Qty: 2 ML | Refills: 2 | Status: SHIPPED | OUTPATIENT
Start: 2019-04-15 | End: 2021-08-30

## 2019-04-18 RX ORDER — INSULIN HUMAN 500 [IU]/ML
INJECTION, SOLUTION SUBCUTANEOUS
Qty: 20 ML | Refills: 2 | Status: SHIPPED | OUTPATIENT
Start: 2019-04-18 | End: 2019-05-30 | Stop reason: SDUPTHER

## 2019-05-01 RX ORDER — SYRINGE,INSUL U-500,NDL,0.5ML 31GX15/64"
SYRINGE, EMPTY DISPOSABLE MISCELLANEOUS
Qty: 100 EACH | Refills: 11 | Status: SHIPPED | OUTPATIENT
Start: 2019-05-01

## 2019-05-30 ENCOUNTER — TELEPHONE (OUTPATIENT)
Dept: FAMILY MEDICINE CLINIC | Facility: CLINIC | Age: 49
End: 2019-05-30

## 2019-07-15 RX ORDER — METFORMIN HYDROCHLORIDE 500 MG/1
TABLET, EXTENDED RELEASE ORAL
Qty: 120 TABLET | Refills: 2 | OUTPATIENT
Start: 2019-07-15

## 2019-07-15 RX ORDER — DULAGLUTIDE 1.5 MG/.5ML
INJECTION, SOLUTION SUBCUTANEOUS
Qty: 2 ML | Refills: 2 | OUTPATIENT
Start: 2019-07-15

## 2019-07-16 RX ORDER — DULAGLUTIDE 1.5 MG/.5ML
INJECTION, SOLUTION SUBCUTANEOUS
Qty: 2 ML | Refills: 2 | OUTPATIENT
Start: 2019-07-16

## 2019-09-16 RX ORDER — DILTIAZEM HYDROCHLORIDE 180 MG/1
CAPSULE, COATED, EXTENDED RELEASE ORAL
Qty: 30 CAPSULE | Refills: 6 | Status: SHIPPED | OUTPATIENT
Start: 2019-09-16

## 2019-10-21 DIAGNOSIS — Z79.890 ON POSTMENOPAUSAL HORMONE REPLACEMENT THERAPY: ICD-10-CM

## 2019-10-22 RX ORDER — ESTRADIOL 1 MG/1
1 TABLET ORAL DAILY
Qty: 30 TABLET | Refills: 0 | Status: SHIPPED | OUTPATIENT
Start: 2019-10-22 | End: 2022-04-18

## 2019-11-18 DIAGNOSIS — Z79.890 ON POSTMENOPAUSAL HORMONE REPLACEMENT THERAPY: ICD-10-CM

## 2019-11-18 RX ORDER — ESTRADIOL 1 MG/1
1 TABLET ORAL DAILY
Qty: 30 TABLET | Refills: 0 | OUTPATIENT
Start: 2019-11-18

## 2021-04-13 ENCOUNTER — OFFICE VISIT (OUTPATIENT)
Dept: ENDOCRINOLOGY | Facility: CLINIC | Age: 51
End: 2021-04-13

## 2021-04-13 VITALS
OXYGEN SATURATION: 99 % | HEART RATE: 120 BPM | WEIGHT: 184 LBS | DIASTOLIC BLOOD PRESSURE: 68 MMHG | HEIGHT: 59 IN | SYSTOLIC BLOOD PRESSURE: 106 MMHG | BODY MASS INDEX: 37.09 KG/M2

## 2021-04-13 DIAGNOSIS — L60.2 HYPERTROPHIC TOENAIL: Primary | ICD-10-CM

## 2021-04-13 DIAGNOSIS — E11.69 MIXED DIABETIC HYPERLIPIDEMIA ASSOCIATED WITH TYPE 2 DIABETES MELLITUS (HCC): ICD-10-CM

## 2021-04-13 DIAGNOSIS — E11.65 TYPE 2 DIABETES MELLITUS WITH HYPERGLYCEMIA, WITH LONG-TERM CURRENT USE OF INSULIN (HCC): ICD-10-CM

## 2021-04-13 DIAGNOSIS — I10 ESSENTIAL HYPERTENSION: ICD-10-CM

## 2021-04-13 DIAGNOSIS — Z79.4 TYPE 2 DIABETES MELLITUS WITH HYPERGLYCEMIA, WITH LONG-TERM CURRENT USE OF INSULIN (HCC): ICD-10-CM

## 2021-04-13 DIAGNOSIS — E78.2 MIXED DIABETIC HYPERLIPIDEMIA ASSOCIATED WITH TYPE 2 DIABETES MELLITUS (HCC): ICD-10-CM

## 2021-04-13 PROCEDURE — 95251 CONT GLUC MNTR ANALYSIS I&R: CPT | Performed by: NURSE PRACTITIONER

## 2021-04-13 PROCEDURE — 99214 OFFICE O/P EST MOD 30 MIN: CPT | Performed by: NURSE PRACTITIONER

## 2021-04-13 RX ORDER — VALACYCLOVIR HYDROCHLORIDE 1 G/1
TABLET, FILM COATED ORAL
COMMUNITY
Start: 2021-02-24

## 2021-04-13 RX ORDER — FLASH GLUCOSE SENSOR
KIT MISCELLANEOUS
COMMUNITY
Start: 2021-03-26

## 2021-04-13 RX ORDER — PEN NEEDLE, DIABETIC 30 GX3/16"
1 NEEDLE, DISPOSABLE MISCELLANEOUS 4 TIMES DAILY
Qty: 120 EACH | Refills: 11 | Status: SHIPPED | OUTPATIENT
Start: 2021-04-13 | End: 2022-05-09

## 2021-04-13 RX ORDER — LEVOTHYROXINE SODIUM 112 UG/1
TABLET ORAL
COMMUNITY
Start: 2021-02-13 | End: 2022-04-18

## 2021-04-13 RX ORDER — POTASSIUM CHLORIDE 750 MG/1
TABLET, FILM COATED, EXTENDED RELEASE ORAL
COMMUNITY
Start: 2021-02-24 | End: 2022-09-21

## 2021-04-13 RX ORDER — VENLAFAXINE 75 MG/1
TABLET ORAL
COMMUNITY
Start: 2021-02-12 | End: 2022-09-21

## 2021-04-13 RX ORDER — CYANOCOBALAMIN 1000 UG/ML
INJECTION, SOLUTION INTRAMUSCULAR; SUBCUTANEOUS
COMMUNITY
Start: 2020-11-04 | End: 2022-04-18

## 2021-04-13 RX ORDER — ICOSAPENT ETHYL 1000 MG/1
CAPSULE ORAL
COMMUNITY
Start: 2021-04-09

## 2021-04-13 RX ORDER — OMEGA-3-ACID ETHYL ESTERS 1 G/1
CAPSULE, LIQUID FILLED ORAL
COMMUNITY
Start: 2021-04-09 | End: 2022-09-21

## 2021-04-13 RX ORDER — DULAGLUTIDE 3 MG/.5ML
3 INJECTION, SOLUTION SUBCUTANEOUS WEEKLY
Qty: 4 PEN | Refills: 11 | Status: SHIPPED | OUTPATIENT
Start: 2021-04-13 | End: 2021-08-30

## 2021-04-13 RX ORDER — EVOLOCUMAB 420 MG/3.5
KIT SUBCUTANEOUS
COMMUNITY
Start: 2021-02-24

## 2021-04-13 RX ORDER — INSULIN GLARGINE 100 [IU]/ML
INJECTION, SOLUTION SUBCUTANEOUS
COMMUNITY
Start: 2021-04-09 | End: 2022-09-21

## 2021-04-13 RX ORDER — NITROGLYCERIN 0.4 MG/1
TABLET SUBLINGUAL
COMMUNITY
Start: 2021-02-11 | End: 2022-09-21

## 2021-04-13 RX ORDER — DILTIAZEM HYDROCHLORIDE 360 MG/1
360 CAPSULE, EXTENDED RELEASE ORAL DAILY
COMMUNITY
Start: 2021-02-11 | End: 2022-02-12

## 2021-04-13 RX ORDER — INSULIN LISPRO 100 [IU]/ML
INJECTION, SOLUTION INTRAVENOUS; SUBCUTANEOUS
Qty: 6 PEN | Refills: 11 | Status: SHIPPED | OUTPATIENT
Start: 2021-04-13 | End: 2022-04-12

## 2021-04-13 RX ORDER — SITAGLIPTIN 50 MG/1
TABLET, FILM COATED ORAL
COMMUNITY
Start: 2021-04-09 | End: 2022-09-21

## 2021-04-13 RX ORDER — PANTOPRAZOLE SODIUM 40 MG/1
40 TABLET, DELAYED RELEASE ORAL
COMMUNITY
Start: 2021-02-24

## 2021-04-13 RX ORDER — ONDANSETRON 4 MG/1
TABLET, ORALLY DISINTEGRATING ORAL
COMMUNITY
Start: 2021-02-24

## 2021-04-13 RX ORDER — METOCLOPRAMIDE 5 MG/1
5 TABLET ORAL 4 TIMES DAILY
COMMUNITY
Start: 2021-03-12 | End: 2022-09-21

## 2021-04-13 NOTE — PROGRESS NOTES
"Chief Complaint  Establish Care and Diabetes    Subjective          Humberto Colvin presents to [facility]  History of Present Illness     In office visit     Referring provider POORNIMA Barnes     Reason -- diabetes mellitus type 2    Duration diagnosed in 2010     Timing constant       Quality not controlled            Severity -  severe     Complications - peripheral neuropathy     Current symptoms/problems  Burning feet pain      Alleviating Factors: Compliance       Side Effects  none     Regular diet      Current exercise none     Current monitoring regimen: home blood tests - 3 times daily before crispin       Home blood sugar records:      [employer]pin personal use    Downloaded and reviewed from March 13 to April 13, 2021    Average glucose was 167    Time in target 45%    High 18%    Very high 22%    Low 10%    Very low 5%      Hypoglycemia yes               Objective   Vital Signs:   /68   Pulse 120   Ht 149.9 cm (59\")   Wt 83.5 kg (184 lb)   SpO2 99%   BMI 37.16 kg/m²     Physical Exam  Constitutional:       Appearance: Normal appearance.   HENT:      Head: Normocephalic.      Right Ear: External ear normal.      Left Ear: External ear normal.   Eyes:      Conjunctiva/sclera: Conjunctivae normal.      Pupils: Pupils are equal, round, and reactive to light.   Cardiovascular:      Rate and Rhythm: Regular rhythm.      Heart sounds: Normal heart sounds.   Pulmonary:      Breath sounds: Normal breath sounds.   Musculoskeletal:         General: Normal range of motion.      Cervical back: Normal range of motion.   Skin:     Coloration: Skin is not pale.   Neurological:      General: No focal deficit present.      Mental Status: She is alert.   Psychiatric:         Mood and Affect: Mood normal.         Thought Content: Thought content normal.        Result Review :   The following data was reviewed by: POORNIMA Jessica on 04/13/2021:  Common labs    Common " Labsle 9/29/20 1/8/21 2/3/21   Total Cholesterol   196   Triglycerides   206 (A)   HDL Cholesterol   37 (A)   LDL Cholesterol    118 (A)   Hemoglobin A1C 9.6 (A) 11.3 (A)    (A) Abnormal value       Comments are available for some flowsheets but are not being displayed.                     Assessment and Plan {CC Problem List  Visit Diagnosis  ROS  Review (Popup)  Health Maintenance  Quality  BestPractice  Medications  SmartSets  SnapShot Encounters  Media :23}   Diagnoses and all orders for this visit:    1. Hypertrophic toenail (Primary)  -     Ambulatory Referral to Podiatry    2. Type 2 diabetes mellitus with hyperglycemia, with long-term current use of insulin (CMS/[facility])    3. Essential hypertension    4. Mixed diabetic hyperlipidemia associated with type 2 diabetes mellitus (CMS/[facility])    Other orders  -     Dulaglutide (Trulicity) 3 MG/0.5ML solution pen-injector; Inject 3 mg under the skin into the appropriate area as directed 1 (One) Time Per Week.  Dispense: 4 pen; Refill: 11  -     Insulin Lispro, 1 Unit Dial, (HumaLOG KwikPen) 100 UNIT/ML solution pen-injector; Up to 50 units with meals TID  Dispense: 6 pen; Refill: 11  -     Insulin Pen Needle (Pen Needles) 32G X 4 MM misc; 1 each 4 (Four) Times a Day. Use 4 x daily, Dx code E11.65  Dispense: 120 each; Refill: 11                 Glycemic Management         Diabetes mellitus type 2    Lab Results   Component Value Date    HGBA1C 11.3 (H) 01/08/2021          C[employer]pin personal use    Downloaded and reviewed from March 13 to April 13, 2021    Average glucose was 167    Time in target 45%    High 18%    Very high 22%    Low 10%    Very low 5%    When she is using the U500  she has low blood sugars    So she is only using [id number] when the sugars are high to prevent the low    Then when she does not use it then she has high sugars the next day    What we will do is stop the Humulin U 500 and go back to you 100     Insulin resistant diabetes with  lipodystrophic features          Taking Basaglar 55 units once a day --keep    Taking metformin 1000 mg bid      Taking Trulicity 1.5 mg once weekly --- increase to 3 mg weekly      Taking humulin u 500 --- takes 85 units before breakfast -=---stop this medication     Go back to Humalog for meals     Start with 15 units with each meal --- will adjust the insulin based on her CGM reading and the amount of carbs that she is eating    Plus sliding scale of 2 per 50 above 150                                  Lipid Management     Total Cholesterol   Date Value Ref Range Status   02/03/2021 196 <200 mg/dL Final     Triglycerides   Date Value Ref Range Status   02/03/2021 206 (H) 30 - 150 mg/dL Final     HDL Cholesterol   Date Value Ref Range Status   02/03/2021 37 (L) 39 - 96 mg/dL Final     LDL Cholesterol    Date Value Ref Range Status   02/03/2021 118 (H) 5 - 99 mg/dL Final     Taking Zetia    Taking TriCor 160    Intolerant to statins         Blood Pressure Management:         Taking lisinopril 10 mg 1 daily        Microvascular Complication Monitoring:       Eye Exam Evaluation, January 2021     -----------     Last Microalbumin-Proteinuria Assessment     No results found for: VILMAALCUCA [id number]     -----------        Neuropathy, yes      Taking gabapentin 300 mg 3 times a day         Toenails are thickened discolored and she has one that continuously breaks    Will refer to podiatry              Weight Related:       Patient's Body mass index is 37.16 kg/m². BMI is above normal parameters. Recommendations include: nutrition counseling.       Diet interventions: moderate (500 kCal/d) deficit diet.               Bone Health               Lab Results   Component Value Date     CALCIUM 9.1 09/18/2015         Thyroid Health          Lab Results   Component Value Date    TSH 2.22 06/04/2019             Other Diabetes Related Aspects         Lab Results   Component Value Date    [id number] 146 (L) 01/09/2021         taking b 12 supplement             Follow Up   Return in about 6 weeks (around 5/25/2021) for Recheck.  Patient was given instructions and counseling regarding her condition or for health maintenance advice. Please see specific information pulled into the AVS if appropriate.

## 2021-04-22 ENCOUNTER — OFFICE VISIT (OUTPATIENT)
Dept: PODIATRY | Facility: CLINIC | Age: 51
End: 2021-04-22

## 2021-04-22 VITALS — HEART RATE: 109 BPM | HEIGHT: 59 IN | BODY MASS INDEX: 37.09 KG/M2 | WEIGHT: 184 LBS | OXYGEN SATURATION: 98 %

## 2021-04-22 DIAGNOSIS — L60.0 INGROWN TOENAIL: Primary | ICD-10-CM

## 2021-04-22 DIAGNOSIS — B35.1 ONYCHOMYCOSIS: ICD-10-CM

## 2021-04-22 DIAGNOSIS — E11.42 DIABETIC POLYNEUROPATHY ASSOCIATED WITH TYPE 2 DIABETES MELLITUS (HCC): ICD-10-CM

## 2021-04-22 DIAGNOSIS — M79.675 PAIN OF TOE OF LEFT FOOT: ICD-10-CM

## 2021-04-22 PROCEDURE — 11750 EXCISION NAIL&NAIL MATRIX: CPT | Performed by: PODIATRIST

## 2021-04-22 PROCEDURE — 99203 OFFICE O/P NEW LOW 30 MIN: CPT | Performed by: PODIATRIST

## 2021-04-22 NOTE — PROGRESS NOTES
Humberto Colvin  1970  50 y.o. female   PCP: Elzbieta Cazares 2/3/2021  BS: 115 per patient     Patient came to clinic for concern of left hallux nail is causing pain. Patient states her pain is 4/10.     04/22/2021  Chief Complaint   Patient presents with   • Left Foot - Ingrown Toenail, diabetic foot care           History of Present Illness    Humberto Colvin is a 50 y.o. female with history of diabetes who presents for evaluation of left hallux nail thickening, discoloration and pain.  States this is worsened over the past few months.  This is intermittently painful with direct pressure.  She denies any associated redness or drainage.  She denies any prior trauma or treatments.      Past Medical History:   Diagnosis Date   • Abdominal pain, epigastric    • Abrasion     an d/or friction burn   • Abrasion     and/or friction burn of hand, INFECTED   • Acquired hypothyroidism    • Acute bronchitis    • Acute hypokalemia    • Acute otitis media     right   • Acute sinusitis    • Acute vaginitis    • Anemia    • Aphthous ulceration     of skin and/or mucous membrane      • Asthma    • Asthma    • Astigmatism    • Backache     chronic   • Benign neoplasm of choroid     OS   • Candidiasis     skin and vagina   • Carpal tunnel syndrome    • Cellulitis of foot    • Cobalamin deficiency    • COLD (chronic obstructive lung disease) (CMS/HCC)    • Cough    • Cyst of Bartholin's gland duct    • Diabetes mellitus (CMS/HCC)     no retinopathy   • Diarrhea    • Disorder     Disorder due to type 2 diabetes mellitus     • Dyslipidemia    • Dysphagia    • Dyspnea    • Dysuria    • Encounter for gynecological examination with abnormal finding    • Encounter for screening mammogram for malignant neoplasm of breast    • Epigastric pain    • Essential hypertension    • Family history of thyroid problem    • Fatigue    • Gastritis    • Generalized abdominal pain    • GERD (gastroesophageal reflux disease)    • GI allergy to  food      cow milk, shrimp, sesame seed   • HA (headache)    • Hashimoto's thyroiditis    • Hearing loss    • Heart problem    • Heartburn    • Herpes labialis    • History of chest pain    • Hyperlipidemia    • Hypertensive disorder    • Influenza-like illness    • Insomnia    • Jaw pain    • Maculopapular rash     eruption   • Menopause    • Myopia    • Nasal vestibulitis    • Nausea and vomiting    • Neck pain     chronic   • Need for influenza vaccination    • Neurologic disorder associated with diabetes mellitus (CMS/HCC)    • Neuropathy    • Obesity    • Pain in lower limb    • Pain in wrist    • Patient currently pregnant     - G 2, P 1, 0-1-1   • Peptic stricture of esophagus    • Prescription refill     renewal   • Proteinuria    • Pruritus of vagina    • Restless legs    • Right upper quadrant pain    • Serous otitis media    • Skin lesion    • Sleep apnea    • Stricture of esophagus    • Symptomatic menopausal or female climacteric states    • Tendinitis    • Tobacco use     and exposure   • Type 2 diabetes mellitus (CMS/HCC)    • Upper respiratory infection    • Vomiting          Past Surgical History:   Procedure Laterality Date   • CARDIAC CATHETERIZATION  05/23/2013    No significant epicardial coronary artery disease. Normal left ventricular systolic function.   • CHOLECYSTECTOMY     • COLONOSCOPY  07/18/2014    internal & external hemorrhoids found.   • ENDOSCOPY  10/19/2015    Esophageal stricture was present.Dilatation performed.Normal stomach.Normal duodenum.   • ENDOSCOPY  07/18/2014    Esophageal stricture present. Dilatation performed. Gastritis in stomach. Biospy taken. Normal duodenum.   • EXCISION LESION      Back/Flank Lipoma removed from back   • INJECTION OF MEDICATION  11/09/2015    B12   • INJECTION OF MEDICATION  06/09/2015    Kenalog   • PAP SMEAR  03/30/2011    Negative   • TOTAL ABDOMINAL HYSTERECTOMY WITH SALPINGO OOPHORECTOMY     • VAGINAL DELIVERY      x1         Family History     Problem Relation Age of Onset   • Diabetes Mother    • Hypertension Mother    • Other Mother         respiratory disorder   • Clotting disorder Mother    • Diabetes Father    • Heart disease Father    • Hypertension Father    • Lung cancer Father    • Cancer Father    • Diabetes Paternal Grandmother    • Hypertension Paternal Grandmother    • Hypertension Paternal Grandfather    • Diabetes Paternal Grandfather    • Stroke Other    • Heart disease Maternal Grandfather          Social History     Socioeconomic History   • Marital status:      Spouse name: Not on file   • Number of children: Not on file   • Years of education: Not on file   • Highest education level: Not on file   Tobacco Use   • Smoking status: Former Smoker   • Smokeless tobacco: Never Used   Vaping Use   • Vaping Use: Never used   Substance and Sexual Activity   • Alcohol use: No   • Drug use: No   • Sexual activity: Never     Birth control/protection: Surgical         Current Outpatient Medications   Medication Sig Dispense Refill   • albuterol (PROAIR HFA) 108 (90 BASE) MCG/ACT inhaler Inhale 2 puffs 4 (four) times a day as needed for wheezing.     • aspirin 81 MG chewable tablet 1 PO QD for cardiovascular protection     • BD INSULIN SYRINGE U-500 31G X 6MM 0.5 ML misc USE THREE TIMES A DAY WITH INSULIN. 100 each 11   • Blood Glucose Monitoring Suppl (FREESTYLE LITE) device      • Calcium Carb-Cholecalciferol 600-400 MG-UNIT tablet      • calcium carbonate-vitamin d 600-400 MG-UNIT per tablet Take 1 tablet by mouth 2 (Two) Times a Day.  12   • citalopram (CeleXA) 20 MG tablet 1 tab PO QHS for anxiety & depression     • Continuous Blood Gluc Sensor (FreeStyle Crispin 14 Day Sensor) misc      • cyanocobalamin 1000 MCG/ML injection INJECT 1ML INTRAMUSCULARLY STARTING ONCE A WEEK FOR 4 WEEKS AND THEN EVERY 2 WEEKS     • dilTIAZem (TIAZAC) 360 MG 24 hr capsule Take 360 mg by mouth Daily.     • diltiaZEM CD (CARDIZEM CD) 180 MG 24 hr capsule  TAKE ONE CAPSULE BY MOUTH IN THE MORNING 30 capsule 6   • Dulaglutide (Trulicity) 3 MG/0.5ML solution pen-injector Inject 3 mg under the skin into the appropriate area as directed 1 (One) Time Per Week. 4 pen 11   • EASY TOUCH PEN NEEDLES 31G X 6 MM misc 3 times daily 150 each 11   • estradiol (ESTRACE) 1 MG tablet TAKE 1 TABLET BY MOUTH DAILY. 30 tablet 0   • Evolocumab with Infusor (Repatha Pushtronex System) solution cartridge INJECT 420 MG INTO THE SKIN EVERY 28 DAYS.     • ezetimibe (ZETIA) 10 MG tablet 1 tab PO QD for cholesterol     • fenofibrate 160 MG tablet Take 160 mg by mouth Daily.     • fexofenadine (ALLEGRA) 180 MG tablet 1 tab PO QD prn allergy symptoms     • fluconazole (DIFLUCAN) 150 MG tablet Take 1 tablet by mouth Daily. Repeat dose in 72 hours. 3 tablet 0   • fluticasone (FLONASE) 50 MCG/ACT nasal spray Instill 2 sprays in each nostril QD for nasal congestion/drainage     • furosemide (LASIX) 40 MG tablet Take 40 mg by mouth Daily.     • gabapentin (NEURONTIN) 300 MG capsule Take 300 mg by mouth 2 (Two) Times a Day.  5   • gemfibrozil (LOPID) 600 MG tablet      • glucose blood (ACCU-CHEK GEOVANNA PLUS) test strip 1 each by Other route 3 (three) times a day. Use as instructed     • ibuprofen (ADVIL,MOTRIN) 800 MG tablet Take 800 mg by mouth Daily.     • icosapent ethyl (Vascepa) 1 g capsule capsule TAKE TWO CAPSULES BY MOUTH 2 (TWO) TIMES DAILY.     • Incruse Ellipta 62.5 MCG/INH aerosol powder       • Insulin Glargine (BASAGLAR KWIKPEN) 100 UNIT/ML injection pen      • Insulin Lispro, 1 Unit Dial, (HumaLOG KwikPen) 100 UNIT/ML solution pen-injector Up to 50 units with meals TID 6 pen 11   • Insulin Pen Needle (NOVOFINE) 30G X 8 MM misc As directed     • Insulin Pen Needle (Pen Needles) 32G X 4 MM misc 1 each 4 (Four) Times a Day. Use 4 x daily, Dx code E11.65 120 each 11   • insulin regular (HUMULIN R) 500 UNIT/ML CONCENTRATED injection Inject 100 units at breakfast and 85 at supper. And according  to sliding scale. Max dose per day is 335 20 mL 2   • IPRATROPIUM-ALBUTEROL IN Inhale.     • Januvia 50 MG tablet      • Lancets (ACCU-CHEK SAFE-T PRO) lancets 1 each by Other route 3 (three) times a day. 23 gauge     • levothyroxine (SYNTHROID, LEVOTHROID) 100 MCG tablet 1 tab PO QAM on empty stomach 1 hour before meals/meds     • levothyroxine (SYNTHROID, LEVOTHROID) 112 MCG tablet      • lisinopril (PRINIVIL,ZESTRIL) 10 MG tablet 1 tab PO QD for BP & kidney protection     • magnesium oxide (MAGOX) 400 (241.3 Mg) MG tablet tablet      • Magnesium Oxide 400 (240 Mg) MG tablet Take 400 mg by mouth 2 (Two) Times a Day.  6   • metFORMIN ER (GLUCOPHAGE-XR) 500 MG 24 hr tablet TAKE 2 TABLETS BY MOUTH TWO TIMES A  tablet 2   • metoclopramide (REGLAN) 5 MG tablet Take 5 mg by mouth 4 (Four) Times a Day.     • montelukast (SINGULAIR) 10 MG tablet 1 tab PO QHS for asthma     • nitroglycerin (NITROSTAT) 0.4 MG SL tablet      • nystatin (nystatin) 896148 UNIT/GM powder      • omega-3 acid ethyl esters (LOVAZA) 1 g capsule      • Omega-3 Fatty Acids (FISH OIL PO) Take 1,000 mg by mouth 2 (Two) Times a Day.     • omeprazole (PriLOSEC) 20 MG capsule Take 20 mg by mouth 2 (two) times a day.     • ondansetron ODT (ZOFRAN-ODT) 4 MG disintegrating tablet TAKE 1 TABLET BY MOUTH EVERY 8 HOURS AS NEEDED FOR NAUSEA     • pantoprazole (PROTONIX) 40 MG EC tablet Take 40 mg by mouth.     • potassium chloride (K-DUR,KLOR-CON) 20 MEQ CR tablet Take 20 mEq by mouth Daily.     • potassium chloride 10 MEQ CR tablet TAKE ONE (1) TABLET BY MOUTH TWICE DAILY     • promethazine-dextromethorphan (PROMETHAZINE-DM) 6.25-15 MG/5ML syrup Take 5 mL by mouth 4 (Four) Times a Day As Needed for Cough. 118 mL 0   • ranitidine (ZANTAC) 150 MG tablet Take 150 mg by mouth 2 (two) times a day.     • rOPINIRole (REQUIP) 2 MG tablet Take 2 mg by mouth every night at bedtime.     • TRULICITY 1.5 MG/0.5ML solution pen-injector INJECT 1.5 ML SUBCUTANEOUSLY  "ONCE A WEEK 2 mL 2   • valACYclovir (VALTREX) 1000 MG tablet Take 2 tablets by mouth at onset of fever blister and then 2 tablets 12 hours later.     • venlafaxine (EFFEXOR) 75 MG tablet        No current facility-administered medications for this visit.         OBJECTIVE    Pulse 109   Ht 149.9 cm (59\")   Wt 83.5 kg (184 lb)   LMP 10/06/2001 (Approximate)   SpO2 98%   BMI 37.16 kg/m²       Review of Systems   Constitutional: Positive for unexpected weight change.   HENT: Negative.    Eyes: Negative.    Respiratory: Negative.    Cardiovascular: Negative.    Gastrointestinal: Negative.    Endocrine: Negative.    Genitourinary: Negative.    Musculoskeletal: Negative.    Skin: Negative.    Allergic/Immunologic: Negative.    Neurological: Negative.    Hematological: Negative.    Psychiatric/Behavioral: Negative.          Physical Exam   Constitutional: she appears well-developed and well-nourished.   CV: No chest pain. Normal RR  Resp: Non labored respirations  Psychiatric: she has a normal mood and affect. her behavior is normal.      Lower Extremity Exam:  Vascular: DP/PT pulses palpable 2+.   No hallux edema  Toes warm  Neuro: Protective sensation intact, b/l.  DTRs intact  Integument: No open wounds.  Ingrown medial nail border, left hallux. Mild thickening and discoloration of medial nail border.  No erythema.. +tenderness to palpation.  Web spaces c/d/i  No skin lesions  Musculoskeletal: LE muscle strength 5/5.   Gait normal  Ankle ROM full without pain or crepitus  STJ ROM full without pain or crepitus  No significant digital deformities      Nail Removal    Date/Time: 4/22/2021 4:36 PM  Performed by: Balwinder Jimenez DPM  Authorized by: Balwinder Jimenez DPM   Location: left foot  Location details: left big toe  Anesthesia: digital block    Anesthesia:  Anesthetic total: 3 mL  Preparation: skin prepped with Betadine  Amount removed: partial  Side: medial  Nail matrix removed: partial  Dressing: 4x4 and " antibiotic ointment  Patient tolerance: patient tolerated the procedure well with no immediate complications  Comments: Matrixectomy with 10% NaOH. Neutralized with acetic acid.                  ASSESSMENT AND PLAN    Diagnoses and all orders for this visit:    1. Ingrown toenail (Primary)    2. Pain of toe of left foot    3. Onychomycosis    4. Diabetic polyneuropathy associated with type 2 diabetes mellitus (CMS/[facility])      -Comprehensive foot and ankle exam performed  -Diagnosis, prevention, and treatment of ingrown toe nails discussed with patient, including risks and potential benefits of nail avulsion both temporary and permanent versus simple debridement.  -Pt elected for partial permanent avulsion of left hallux  -Aftercare instructions for soapy loredo soaks BID  -Recheck 2 weeks          This document has been electronically signed by Balwinder Jimenez DPM on April 22, 2021 16:35 CDT       Much of this encounter note is an electronic transcription/translation of spoken language to printed text.   Balwinder Jimenez DPM  4/22/2021  16:35 CDT

## 2021-05-26 ENCOUNTER — TELEMEDICINE (OUTPATIENT)
Dept: ENDOCRINOLOGY | Facility: CLINIC | Age: 51
End: 2021-05-26

## 2021-05-26 DIAGNOSIS — E11.65 TYPE 2 DIABETES MELLITUS WITH HYPERGLYCEMIA, WITH LONG-TERM CURRENT USE OF INSULIN (HCC): Primary | ICD-10-CM

## 2021-05-26 DIAGNOSIS — E78.2 MIXED HYPERLIPIDEMIA: ICD-10-CM

## 2021-05-26 DIAGNOSIS — I10 ESSENTIAL HYPERTENSION: ICD-10-CM

## 2021-05-26 DIAGNOSIS — Z79.4 TYPE 2 DIABETES MELLITUS WITH HYPERGLYCEMIA, WITH LONG-TERM CURRENT USE OF INSULIN (HCC): Primary | ICD-10-CM

## 2021-05-26 DIAGNOSIS — E53.8 B12 DEFICIENCY: ICD-10-CM

## 2021-05-26 PROCEDURE — 99214 OFFICE O/P EST MOD 30 MIN: CPT | Performed by: NURSE PRACTITIONER

## 2021-05-26 NOTE — PROGRESS NOTES
Chief Complaint  No chief complaint on file.    Subjective          Humberto Colvin presents to [facility] ENDOCRINOLOGY  History of Present Illness     You have chosen to receive care through a telehealth visit.  Do you consent to use a video/audio connection for your medical care today? Yes        TELEHEALTH VIDEO VISIT     This a video visit due to [facility] current guidelines for social distancing due to the COVID 19 pandemic    Referring provider POORNIMA Barnes       50-year-old female presents for follow-up    Reason diabetes mellitus type 2    Timing constant    Duration diagnosed in 2010    Quality improving control                 Lab Results   Component Value Date    HGBA1C 8.2 (H) 05/14/2021          Severity high    Microvascular complications peripheral neuropathy       Macro complications none    Current complaints burning feet pain    Alleviating factors is compliance with medication       Regular diet      Current exercise none     Current monitoring regimen: home blood tests - 3 times daily before crispin         Home blood sugar records:      [employer] personal use--could not downloaded          Am 90 up to 115     Greater to 180 up to 230          Hypoglycemia   documented in the past she states she has not had any since we stopped the U5 100           Review of Systems - General ROS: negative      Objective   Vital Signs:   There were no vitals taken for this visit.    Physical Exam  Neurological:      General: No focal deficit present.      Mental Status: She is alert.   Psychiatric:         Mood and Affect: Mood normal.         Thought Content: Thought content normal.         Judgment: Judgment normal.        Result Review :   The following data was reviewed by: POORNIMA Jessica on 05/26/2021:  Common labs    Common Labsle 1/8/21 2/3/21 5/14/21 5/14/21      0916 0916   Total Cholesterol  196  151   Triglycerides  206 (A)  464 (A)   HDL Cholesterol  37 (A)  50   LDL  Cholesterol   118 (A)  42   Hemoglobin A1C 11.3 (A)  8.2 (A)    (A) Abnormal value       Comments are available for some flowsheets but are not being displayed.                     Assessment and Plan    Diagnoses and all orders for this visit:    1. Type 2 diabetes mellitus with hyperglycemia, with long-term current use of insulin (CMS/[facility]) (Primary)    2. B12 deficiency    3. Mixed hyperlipidemia    4. Essential hypertension             Glycemic Management         Diabetes mellitus type 2    Lab Results   Component Value Date    HGBA1C 8.2 (H) 05/14/2021             C[employer] personal use---- could not download                        Taking Basaglar 55 units once a day     Taking metformin 1000 mg bid      Taking Trulicity  3 mg weekly           Taking Humalog 15 units TID ---- increase up to 20 units TID for each           Plus sliding scale of 2 per 50 above 150                                         Lipid Management     May 2021    Total chol - 151   Tg - 464   HDL - 50  LDL - 42           Taking Zetia     Taking TriCor 160     Intolerant to statins           Blood Pressure Management:           Taking lisinopril 10 mg 1 daily        Microvascular Complication Monitoring:       Eye Exam Evaluation, January 2021     -----------     Last Microalbumin-Proteinuria Assessment     May 2021    Negative microabuminuria      -----------        Neuropathy, yes      Taking gabapentin 300 mg 3 times a day                   Weight Related:      Obesity               Bone Health               Lab Results   Component Value Date     CALCIUM 9.1 09/18/2015         Thyroid Health        May 2021     TSH - 4      Other Diabetes Related Aspects               Lab Results   Component Value Date     [id number] 146 (L) 01/09/2021          taking b 12 supplement                     Follow Up   Return in about 3 months (around 8/26/2021) for Recheck.  Patient was given instructions and counseling regarding her condition or for health  maintenance advice. Please see specific information pulled into the AVS if appropriate.         I provided advice regarding diabetes management, dietary changes, adjustments of medication, self titration of insulin, refilled medications, ordering labs, future appointments    Patient was advised to contact the office if there are unanswered questions, trouble with blood glucose management, or any concerns

## 2021-08-30 ENCOUNTER — TELEMEDICINE (OUTPATIENT)
Dept: ENDOCRINOLOGY | Facility: CLINIC | Age: 51
End: 2021-08-30

## 2021-08-30 DIAGNOSIS — E11.65 TYPE 2 DIABETES MELLITUS WITH HYPERGLYCEMIA, WITH LONG-TERM CURRENT USE OF INSULIN (HCC): Primary | ICD-10-CM

## 2021-08-30 DIAGNOSIS — Z79.4 TYPE 2 DIABETES MELLITUS WITH HYPERGLYCEMIA, WITH LONG-TERM CURRENT USE OF INSULIN (HCC): Primary | ICD-10-CM

## 2021-08-30 DIAGNOSIS — E11.42 DIABETIC POLYNEUROPATHY ASSOCIATED WITH TYPE 2 DIABETES MELLITUS (HCC): ICD-10-CM

## 2021-08-30 DIAGNOSIS — I10 ESSENTIAL HYPERTENSION: ICD-10-CM

## 2021-08-30 DIAGNOSIS — E78.2 MIXED HYPERLIPIDEMIA: ICD-10-CM

## 2021-08-30 DIAGNOSIS — E03.9 ACQUIRED HYPOTHYROIDISM: ICD-10-CM

## 2021-08-30 PROCEDURE — 99214 OFFICE O/P EST MOD 30 MIN: CPT | Performed by: NURSE PRACTITIONER

## 2021-08-30 RX ORDER — DULAGLUTIDE 4.5 MG/.5ML
4.5 INJECTION, SOLUTION SUBCUTANEOUS WEEKLY
Qty: 4 PEN | Refills: 11 | Status: SHIPPED | OUTPATIENT
Start: 2021-08-30 | End: 2022-08-04

## 2021-08-30 RX ORDER — DULAGLUTIDE 4.5 MG/.5ML
4.5 INJECTION, SOLUTION SUBCUTANEOUS WEEKLY
Qty: 4 PEN | Refills: 11 | Status: SHIPPED | OUTPATIENT
Start: 2021-08-30 | End: 2021-08-30 | Stop reason: SDUPTHER

## 2021-08-30 NOTE — PROGRESS NOTES
Chief Complaint  Diabetes    Subjective     {Problem List  Visit Diagnosis   Encounters  Notes  Medications  Labs  Result Review Imaging  Media :23}     Humberto Colvin presents to [facility] ENDOCRINOLOGY  History of Present Illness       You have chosen to receive care through a telehealth visit.  Do you consent to use a video/audio connection for your medical care today? Yes          TELEHEALTH VIDEO VISIT     This a video visit due to [facility] current guidelines for social distancing due to the COVID 19 pandemic        Referring provider POORNIMA Barnes       50 year old female presents for follow up     Reason diabetes mellitus type 2    Duration diagnosed in 2010     Timing constant     Severity high     Quality not controlled                 Lab Results   Component Value Date    HGBA1C 8.6 (H) 08/16/2021          Macrovascular complications none      Microvascular complications peripheral neuropathy             Alleviating factors is compliance with medication          Current monitoring regimen: home blood tests - 4 times daily before ole         Home blood sugar records:      [employer] personal use--could not downloaded            100 up to 250         Review of Systems - General ROS: negative                Objective   Vital Signs:   There were no vitals taken for this visit.    Physical Exam  Neurological:      General: No focal deficit present.      Mental Status: She is alert.   Psychiatric:         Mood and Affect: Mood normal.         Thought Content: Thought content normal.         Judgment: Judgment normal.        Result Review :   The following data was reviewed by: POORNIMA Jessica on 08/30/2021:  Common labs    Common Labsle 2/3/21 5/14/21 5/14/21 8/16/21 8/16/21     0916 0916 1126 1126   Total Cholesterol 196  151  166   Triglycerides 206 (A)  464 (A)  330 (A)   HDL Cholesterol 37 (A)  50  47   LDL Cholesterol  118 (A)  42  81   Hemoglobin A1C  8.2 (A)  8.6  (A)    (A) Abnormal value       Comments are available for some flowsheets but are not being displayed.                     Assessment and Plan    Diagnoses and all orders for this visit:    1. Type 2 diabetes mellitus with hyperglycemia, with long-term current use of insulin (CMS/[facility]) (Primary)    2. Acquired hypothyroidism    3. Diabetic polyneuropathy associated with type 2 diabetes mellitus (CMS/[facility])    4. Mixed hyperlipidemia    5. Essential hypertension    Other orders  -     Discontinue: Dulaglutide (Trulicity) 4.5 MG/0.5ML solution pen-injector; Inject 4.5 mg under the skin into the appropriate area as directed 1 (One) Time Per Week.  Dispense: 4 pen; Refill: 11  -     Dulaglutide (Trulicity) 4.5 MG/0.5ML solution pen-injector; Inject 4.5 mg under the skin into the appropriate area as directed 1 (One) Time Per Week.  Dispense: 4 pen; Refill: 11           Glycemic Management         Diabetes mellitus type 2     Lab Results   Component Value Date    HGBA1C 8.6 (H) 08/16/2021              C[employer]pin personal use---- could not download          taking Lantus 55 units once a day             Taking metformin 1000 mg bid           Taking Trulicity  3 mg weekly ----increase to 4.5 mg weekly            Taking Humalog 30 up to 50 units TID for each meal            Plus sliding scale of 2 per 50 above 150                                         Lipid Management     August 2021      Total chol - 166  Tg - 330  HDL - 47  LDL -81               Taking Zetia     Taking TriCor 160     Intolerant to statins           Blood Pressure Management:           Taking lisinopril 10 mg 1 daily        Microvascular Complication Monitoring:       Eye Exam Evaluation, January 2021     -----------     Last Microalbumin-Proteinuria Assessment     May 2021     Negative microabuminuria      -----------        Neuropathy, yes      Taking gabapentin 300 mg 3 times a day                    Weight Related:      Obesity               Bone  Health     Lab Results   Component Value Date    CALCIUM 9.1 09/18/2015          Thyroid Health        Hypothyroidism     Taking levothyroxine     August 2021     TSH - 1.16      Other Diabetes Related Aspects         Lab Results   Component Value Date    [id number] 228 08/16/2021        taking b 12 supplement                           Follow Up   Return in about 3 months (around 11/30/2021) for Recheck.  Patient was given instructions and counseling regarding her condition or for health maintenance advice. Please see specific information pulled into the AVS if appropriate.         This document has been electronically signed by POORNIMA Jessica on August 30, 2021 08:57 CDT.

## 2021-12-03 ENCOUNTER — TELEMEDICINE (OUTPATIENT)
Dept: ENDOCRINOLOGY | Facility: CLINIC | Age: 51
End: 2021-12-03

## 2021-12-03 DIAGNOSIS — Z79.4 TYPE 2 DIABETES MELLITUS WITH HYPERGLYCEMIA, WITH LONG-TERM CURRENT USE OF INSULIN (HCC): Primary | ICD-10-CM

## 2021-12-03 DIAGNOSIS — E78.2 MIXED HYPERLIPIDEMIA: ICD-10-CM

## 2021-12-03 DIAGNOSIS — E11.42 DIABETIC POLYNEUROPATHY ASSOCIATED WITH TYPE 2 DIABETES MELLITUS (HCC): ICD-10-CM

## 2021-12-03 DIAGNOSIS — E11.65 TYPE 2 DIABETES MELLITUS WITH HYPERGLYCEMIA, WITH LONG-TERM CURRENT USE OF INSULIN (HCC): Primary | ICD-10-CM

## 2021-12-03 DIAGNOSIS — I10 PRIMARY HYPERTENSION: ICD-10-CM

## 2021-12-03 PROCEDURE — 99214 OFFICE O/P EST MOD 30 MIN: CPT | Performed by: NURSE PRACTITIONER

## 2021-12-03 NOTE — PROGRESS NOTES
Chief Complaint  Diabetes    Subjective          Tahiranraegan Colvin presents to [facility] ENDOCRINOLOGY  History of Present Illness     You have chosen to receive care through a telehealth visit.  Do you consent to use a video/audio connection for your medical care today? Yes          TELEHEALTH VIDEO VISIT     This a video visit due to [facility] current guidelines for social distancing due to the COVID 19 pandemic        Referring provider POORNIMA Barnes     51 year old female presents for follow up     Diabetes mellitus type 2    Duration diagnosed in 2010      Timing constant    Quality not controlled    Severity is high      Lab Results   Component Value Date    HGBA1C 8.6 (H) 08/16/2021          Macrovascular complications none      Microvascular complications peripheral neuropathy              Alleviating factors is compliance with medication           Current monitoring regimen: home blood tests - 4 times daily before crispin         Home blood sugar records:      Crispin personal use--could not downloaded              States under 180       Review of Systems - General ROS: negative              Objective   Vital Signs:   There were no vitals taken for this visit.    Physical Exam  Neurological:      General: No focal deficit present.      Mental Status: She is alert.   Psychiatric:         Mood and Affect: Mood normal.         Thought Content: Thought content normal.         Judgment: Judgment normal.        Result Review :   The following data was reviewed by: POORNIMA Jessica on 12/03/2021:  Common labs    Common Labsle 2/3/21 5/14/21 5/14/21 8/16/21 8/16/21     0916 0916 1126 1126   Total Cholesterol 196  151  166   Triglycerides 206 (A)  464 (A)  330 (A)   HDL Cholesterol 37 (A)  50  47   LDL Cholesterol  118 (A)  42  81   Hemoglobin A1C  8.2 (A)  8.6 (A)    (A) Abnormal value       Comments are available for some flowsheets but are not being displayed.                      Assessment and Plan    Diagnoses and all orders for this visit:    1. Type 2 diabetes mellitus with hyperglycemia, with long-term current use of insulin (HCC) (Primary)    2. Diabetic polyneuropathy associated with type 2 diabetes mellitus (HCC)    3. Mixed hyperlipidemia    4. Primary hypertension           Glycemic Management         Diabetes mellitus type 2           Lab Results   Component Value Date     HGBA1C 8.6 (H) 08/16/2021                Crispin personal use---- could not download          taking Lantus 55 units once a day             Taking metformin 1000 mg bid            Taking Trulicity   4.5 mg weekly            Taking Humalog 30 up to 50 units TID for each meal            Plus sliding scale of 2 per 50 above 150                                         Lipid Management     August 2021      Total chol - 166  Tg - 330  HDL - 47  LDL -81               Taking Zetia     Taking TriCor 160     Intolerant to statins           Blood Pressure Management:           Taking lisinopril 10 mg 1 daily        Microvascular Complication Monitoring:       Eye Exam Evaluation, January 2021     -----------     Last Microalbumin-Proteinuria Assessment     May 2021     Negative microabuminuria      -----------        Neuropathy, yes      Taking gabapentin 300 mg 3 times a day                    Weight Related:      Obesity               Bone Health           Lab Results   Component Value Date     CALCIUM 9.1 09/18/2015            Thyroid Health        Hypothyroidism      Taking levothyroxine      August 2021      TSH - 1.16      Other Diabetes Related Aspects               Lab Results   Component Value Date     [id number] 228 08/16/2021          taking b 12 supplement                         Follow Up   No follow-ups on file.  Patient was given instructions and counseling regarding her condition or for health maintenance advice. Please see specific information pulled into the AVS if appropriate.         This document  has been electronically signed by POORNIMA Jessica on December 3, 2021 12:09 CST.

## 2022-04-12 RX ORDER — INSULIN LISPRO 100 [IU]/ML
INJECTION, SOLUTION INTRAVENOUS; SUBCUTANEOUS
Qty: 45 ML | Refills: 11 | Status: SHIPPED | OUTPATIENT
Start: 2022-04-12

## 2022-04-18 ENCOUNTER — OFFICE VISIT (OUTPATIENT)
Dept: PODIATRY | Facility: CLINIC | Age: 52
End: 2022-04-18

## 2022-04-18 VITALS — HEIGHT: 59 IN | BODY MASS INDEX: 37.09 KG/M2 | HEART RATE: 101 BPM | WEIGHT: 184 LBS | OXYGEN SATURATION: 96 %

## 2022-04-18 DIAGNOSIS — E11.42 DIABETIC POLYNEUROPATHY ASSOCIATED WITH TYPE 2 DIABETES MELLITUS: ICD-10-CM

## 2022-04-18 DIAGNOSIS — M79.672 LEFT FOOT PAIN: ICD-10-CM

## 2022-04-18 DIAGNOSIS — M79.5 FOREIGN BODY (FB) IN SOFT TISSUE: ICD-10-CM

## 2022-04-18 DIAGNOSIS — L03.119 CELLULITIS OF FOOT: Primary | ICD-10-CM

## 2022-04-18 PROCEDURE — 99213 OFFICE O/P EST LOW 20 MIN: CPT | Performed by: PODIATRIST

## 2022-04-18 PROCEDURE — 28190 REMOVAL OF FOOT FOREIGN BODY: CPT | Performed by: PODIATRIST

## 2022-04-18 RX ORDER — LISINOPRIL 10 MG/1
1 TABLET ORAL DAILY
COMMUNITY
Start: 2022-02-14

## 2022-04-18 RX ORDER — FENOFIBRATE 160 MG/1
1 TABLET ORAL DAILY
COMMUNITY
Start: 2022-04-05

## 2022-04-18 RX ORDER — CLOTRIMAZOLE AND BETAMETHASONE DIPROPIONATE 10; .64 MG/G; MG/G
CREAM TOPICAL
COMMUNITY
Start: 2022-04-15

## 2022-04-18 RX ORDER — ESTRADIOL 1 MG/1
1 TABLET ORAL DAILY
COMMUNITY
Start: 2022-02-14

## 2022-04-18 RX ORDER — FLASH GLUCOSE SCANNING READER
EACH MISCELLANEOUS SEE ADMIN INSTRUCTIONS
COMMUNITY
Start: 2022-01-25

## 2022-04-18 RX ORDER — CEPHALEXIN 500 MG/1
500 CAPSULE ORAL 3 TIMES DAILY
COMMUNITY
Start: 2022-04-15 | End: 2022-04-26

## 2022-04-18 RX ORDER — MAGNESIUM OXIDE 400 MG/1
1 TABLET ORAL 3 TIMES DAILY
COMMUNITY
Start: 2022-01-14

## 2022-04-18 RX ORDER — MONTELUKAST SODIUM 10 MG/1
1 TABLET ORAL DAILY
COMMUNITY
Start: 2022-02-14

## 2022-04-18 RX ORDER — METFORMIN HYDROCHLORIDE 500 MG/1
2 TABLET, EXTENDED RELEASE ORAL 2 TIMES DAILY
COMMUNITY
Start: 2022-01-14

## 2022-04-18 RX ORDER — LEVOTHYROXINE SODIUM 0.15 MG/1
150 TABLET ORAL
COMMUNITY
Start: 2022-04-15

## 2022-04-18 NOTE — PROGRESS NOTES
Humberto Colvin  1970  51 y.o. female   PCP: Elzbieta Cazares 4/15/2022  BS: 200 per patient     Patient return to clinic for concern of a piece of wood in left foot.     4/18/2022  Chief Complaint   Patient presents with   • Left Foot - Foreign Body in Skin           History of Present Illness    Humberto Colvin is a 51 y.o. female with history of diabetes who presents for new problem left foot foreign body.  Patient states that she obtained this a little over a week prior [place].  She was seen by urgent care treated with antibiotics feels her symptoms are somewhat improving.    Past Medical History:   Diagnosis Date   • Abdominal pain, epigastric    • Abrasion     an d/or friction burn   • Abrasion     and/or friction burn of hand, INFECTED   • Acquired hypothyroidism    • Acute bronchitis    • Acute hypokalemia    • Acute otitis media     right   • Acute sinusitis    • Acute vaginitis    • Anemia    • Aphthous ulceration     of skin and/or mucous membrane      • Asthma    • Asthma    • Astigmatism    • Backache     chronic   • Benign neoplasm of choroid     OS   • Candidiasis     skin and vagina   • Carpal tunnel syndrome    • Cellulitis of foot    • Cobalamin deficiency    • COLD (chronic obstructive lung disease) (HCC)    • Cough    • Cyst of Bartholin's gland duct    • Diabetes mellitus (HCC)     no retinopathy   • Diarrhea    • Disorder     Disorder due to type 2 diabetes mellitus     • Dyslipidemia    • Dysphagia    • Dyspnea    • Dysuria    • Encounter for gynecological examination with abnormal finding    • Encounter for screening mammogram for malignant neoplasm of breast    • Epigastric pain    • Essential hypertension    • Family history of thyroid problem    • Fatigue    • Gastritis    • Generalized abdominal pain    • GERD (gastroesophageal reflux disease)    • GI allergy to food      cow milk, shrimp, sesame seed   • HA (headache)    • Hashimoto's thyroiditis    •  Hearing loss    • Heart problem    • Heartburn    • Herpes labialis    • History of chest pain    • Hyperlipidemia    • Hypertensive disorder    • Influenza-like illness    • Insomnia    • Jaw pain    • Maculopapular rash     eruption   • Menopause    • Myopia    • Nasal vestibulitis    • Nausea and vomiting    • Neck pain     chronic   • Need for influenza vaccination    • Neurologic disorder associated with diabetes mellitus (HCC)    • Neuropathy    • Obesity    • Pain in lower limb    • Pain in wrist    • Patient currently pregnant     - G 2, P 1, 0-1-1   • Peptic stricture of esophagus    • Prescription refill     renewal   • Proteinuria    • Pruritus of vagina    • Restless legs    • Right upper quadrant pain    • Serous otitis media    • Skin lesion    • Sleep apnea    • Stricture of esophagus    • Symptomatic menopausal or female climacteric states    • Tendinitis    • Tobacco use     and exposure   • Type 2 diabetes mellitus (HCC)    • Upper respiratory infection    • Vomiting          Past Surgical History:   Procedure Laterality Date   • CARDIAC CATHETERIZATION  05/23/2013    No significant epicardial coronary artery disease. Normal left ventricular systolic function.   • CHOLECYSTECTOMY     • COLONOSCOPY  07/18/2014    internal & external hemorrhoids found.   • ENDOSCOPY  10/19/2015    Esophageal stricture was present.Dilatation performed.Normal stomach.Normal duodenum.   • ENDOSCOPY  07/18/2014    Esophageal stricture present. Dilatation performed. Gastritis in stomach. Biospy taken. Normal duodenum.   • EXCISION LESION      Back/Flank Lipoma removed from back   • INJECTION OF MEDICATION  11/09/2015    B12   • INJECTION OF MEDICATION  06/09/2015    Kenalog   • PAP SMEAR  03/30/2011    Negative   • TOTAL ABDOMINAL HYSTERECTOMY WITH SALPINGO OOPHORECTOMY     • VAGINAL DELIVERY      x1         Family History   Problem Relation Age of Onset   • Diabetes Mother    • Hypertension Mother    • Other Mother          respiratory disorder   • Clotting disorder Mother    • Diabetes Father    • Heart disease Father    • Hypertension Father    • Lung cancer Father    • Cancer Father    • Diabetes Paternal Grandmother    • Hypertension Paternal Grandmother    • Hypertension Paternal Grandfather    • Diabetes Paternal Grandfather    • Stroke Other    • Heart disease Maternal Grandfather          Social History     Socioeconomic History   • Marital status:    Tobacco Use   • Smoking status: Former Smoker   • Smokeless tobacco: Never Used   Vaping Use   • Vaping Use: Never used   Substance and Sexual Activity   • Alcohol use: No   • Drug use: No   • Sexual activity: Never     Birth control/protection: Surgical         Current Outpatient Medications   Medication Sig Dispense Refill   • albuterol sulfate  (90 Base) MCG/ACT inhaler Inhale 2 puffs 4 (four) times a day as needed for wheezing.     • aspirin 81 MG chewable tablet 1 PO QD for cardiovascular protection     • BD INSULIN SYRINGE U-500 31G X 6MM 0.5 ML misc USE THREE TIMES A DAY WITH INSULIN. 100 each 11   • Blood Glucose Monitoring Suppl (FREESTYLE LITE) device      • Calcium Carb-Cholecalciferol 600-400 MG-UNIT tablet      • calcium carbonate-vitamin d 600-400 MG-UNIT per tablet Take 1 tablet by mouth 2 (Two) Times a Day.  12   • cephalexin (KEFLEX) 500 MG capsule Take 500 mg by mouth 3 (Three) Times a Day.     • citalopram (CeleXA) 20 MG tablet 1 tab PO QHS for anxiety & depression     • clotrimazole-betamethasone (LOTRISONE) 1-0.05 % cream Apply  topically to the appropriate area as directed.     • Continuous Blood Gluc  (FreeStyle Crispin 14 Day [place]) device See Admin Instructions.     • Continuous Blood Gluc Sensor (FreeStyle Crispin 14 Day Sensor) misc      • cyanocobalamin (VITAMIN B-12) 1000 MCG tablet Take 1,000 mcg by mouth Daily.     • diltiaZEM CD (CARDIZEM CD) 180 MG 24 hr capsule TAKE ONE CAPSULE BY MOUTH IN THE MORNING 30 capsule 6   •  Dulaglutide (Trulicity) 4.5 MG/0.5ML solution pen-injector Inject 4.5 mg under the skin into the appropriate area as directed 1 (One) Time Per Week. 4 pen 11   • EASY TOUCH PEN NEEDLES 31G X 6 MM misc 3 times daily 150 each 11   • estradiol (ESTRACE) 1 MG tablet Take 1 tablet by mouth Daily.     • Evolocumab with Infusor (Repatha Pushtronex System) solution cartridge INJECT 420 MG INTO THE SKIN EVERY 28 DAYS.     • ezetimibe (ZETIA) 10 MG tablet 1 tab PO QD for cholesterol     • fenofibrate 160 MG tablet Take 1 tablet by mouth Daily.     • fexofenadine (ALLEGRA) 180 MG tablet 1 tab PO QD prn allergy symptoms     • fluconazole (DIFLUCAN) 150 MG tablet Take 1 tablet by mouth Daily. Repeat dose in 72 hours. 3 tablet 0   • fluticasone (FLONASE) 50 MCG/ACT nasal spray Instill 2 sprays in each nostril QD for nasal congestion/drainage     • furosemide (LASIX) 40 MG tablet Take 40 mg by mouth Daily.     • gabapentin (NEURONTIN) 300 MG capsule Take 300 mg by mouth 2 (Two) Times a Day.  5   • gemfibrozil (LOPID) 600 MG tablet      • glucose blood test strip 1 each by Other route 3 (Three) Times a Day. Use as instructed     • HumaLOG KwikPen 100 UNIT/ML solution pen-injector INJECT UP TO 50 UNITS WITH MEALS THREE TIMES A DAY AS DIRECTED 45 mL 11   • ibuprofen (ADVIL,MOTRIN) 800 MG tablet Take 800 mg by mouth Daily.     • icosapent ethyl (VASCEPA) 1 g capsule capsule TAKE TWO CAPSULES BY MOUTH 2 (TWO) TIMES DAILY.     • Incruse Ellipta 62.5 MCG/INH aerosol powder       • Insulin Glargine (BASAGLAR KWIKPEN) 100 UNIT/ML injection pen      • Insulin Pen Needle (Pen Needles) 32G X 4 MM misc 1 each 4 (Four) Times a Day. Use 4 x daily, Dx code E11.65 120 each 11   • Insulin Pen Needle 30G X 8 MM misc As directed     • insulin regular (HUMULIN R) 500 UNIT/ML CONCENTRATED injection Inject 100 units at breakfast and 85 at supper. And according to sliding scale. Max dose per day is 335 20 mL 2   • IPRATROPIUM-ALBUTEROL IN Inhale.     •  Januvia 50 MG tablet      • Lancets (ACCU-CHEK SAFE-T PRO) lancets 1 each by Other route 3 (Three) Times a Day. 23 gauge     • levothyroxine (SYNTHROID, LEVOTHROID) 150 MCG tablet Take 150 mcg by mouth.     • lisinopril (PRINIVIL,ZESTRIL) 10 MG tablet Take 1 tablet by mouth Daily.     • magnesium oxide (MAG-OX) 400 MG tablet Take 1 tablet by mouth 3 (Three) Times a Day.     • Magnesium Oxide 400 (240 Mg) MG tablet Take 400 mg by mouth 2 (Two) Times a Day.  6   • metFORMIN ER (GLUCOPHAGE-XR) 500 MG 24 hr tablet Take 2 tablets by mouth 2 (Two) Times a Day.     • metoclopramide (REGLAN) 5 MG tablet Take 5 mg by mouth 4 (Four) Times a Day.     • montelukast (SINGULAIR) 10 MG tablet Take 1 tablet by mouth Daily.     • nitroglycerin (NITROSTAT) 0.4 MG SL tablet      • nystatin (MYCOSTATIN) 822985 UNIT/GM powder      • omega-3 acid ethyl esters (LOVAZA) 1 g capsule      • Omega-3 Fatty Acids (FISH OIL PO) Take 1,000 mg by mouth 2 (Two) Times a Day.     • omeprazole (PriLOSEC) 20 MG capsule Take 20 mg by mouth 2 (two) times a day.     • ondansetron ODT (ZOFRAN-ODT) 4 MG disintegrating tablet TAKE 1 TABLET BY MOUTH EVERY 8 HOURS AS NEEDED FOR NAUSEA     • pantoprazole (PROTONIX) 40 MG EC tablet Take 40 mg by mouth.     • potassium chloride (K-DUR,KLOR-CON) 20 MEQ CR tablet Take 20 mEq by mouth Daily.     • potassium chloride 10 MEQ CR tablet TAKE ONE (1) TABLET BY MOUTH TWICE DAILY     • promethazine-dextromethorphan (PROMETHAZINE-DM) 6.25-15 MG/5ML syrup Take 5 mL by mouth 4 (Four) Times a Day As Needed for Cough. 118 mL 0   • ranitidine (ZANTAC) 150 MG tablet Take 150 mg by mouth 2 (two) times a day.     • rOPINIRole (REQUIP) 2 MG tablet Take 2 mg by mouth every night at bedtime.     • valACYclovir (VALTREX) 1000 MG tablet Take 2 tablets by mouth at onset of fever blister and then 2 tablets 12 hours later.     • venlafaxine (EFFEXOR) 75 MG tablet        No current facility-administered medications for this visit.  "        OBJECTIVE    Pulse 101   Ht 149.9 cm (59\")   Wt 83.5 kg (184 lb)   LMP 10/06/2001 (Approximate)   SpO2 96%   BMI 37.16 kg/m²       Review of Systems   Constitutional: Positive for unexpected weight change.   HENT: Negative.    Eyes: Negative.    Respiratory: Negative.    Cardiovascular: Negative.    Gastrointestinal: Negative.    Endocrine: Negative.    Genitourinary: Negative.    Musculoskeletal: Negative.    Skin: Negative.    Allergic/Immunologic: Negative.    Neurological: Negative.    Hematological: Negative.    Psychiatric/Behavioral: Negative.          Physical Exam   Constitutional: she appears well-developed and well-nourished.   CV: No chest pain. Normal RR  Resp: Non labored respirations  Psychiatric: she has a normal mood and affect. her behavior is normal.      Lower Extremity Exam:  Vascular: DP/PT pulses palpable 2+.   No hallux edema  Toes warm  Neuro: Protective sensation intact, b/l.  DTRs intact  Integument: No open wounds.  Small laceration to plantar left forefoot with apparent foreign body extending into the subcutaneous layer.  Minimal surrounding erythema.  No active drainage.  Moderate tenderness palpation  Web spaces c/d/i  No skin lesions  Musculoskeletal: LE muscle strength 5/5.   Gait normal  Ankle ROM full without pain or crepitus  STJ ROM full without pain or crepitus  No significant digital deformities      Foreign Body Removal    Date/Time: 4/18/2022 2:49 PM  Performed by: Balwinder Jimenez DPM  Authorized by: Balwinder Jimenez DPM   Consent: Verbal consent obtained.  Risks and benefits: risks, benefits and alternatives were discussed  Body area: skin  General location: lower extremity  Location details: left foot    Sedation:  Patient sedated: no    Localization method: visualized  Removal mechanism: hemostat  Dressing: antibiotic ointment  Tendon involvement: none  Depth: subcutaneous  Complexity: simple  Post-procedure assessment: foreign body removed  Patient " tolerance: patient tolerated the procedure well with no immediate complications              ASSESSMENT AND PLAN    Diagnoses and all orders for this visit:    1. Cellulitis of foot (Primary)    2. Foreign body (FB) in soft tissue  -     XR Foot 3+ View Left    3. Diabetic polyneuropathy associated with type 2 diabetes mellitus (HCC)    4. Left foot pain    Other orders  -     Foreign Body Removal      -Comprehensive foot and ankle exam performed  -Left forefoot laceration debrided.  Foreign body removed as above.  Antibiotic bandage applied  -Complete antibiotic course  -Recheck 2 weeks as needed          This document has been electronically signed by Balwinder Jimenez DPM on April 18, 2022 14:51 CDT       Much of this encounter note is an electronic transcription/translation of spoken language to printed text.   Balwinder Jimenez DPM  4/18/2022  14:51 CDT

## 2022-05-09 RX ORDER — PEN NEEDLE, DIABETIC 32GX 5/32"
NEEDLE, DISPOSABLE MISCELLANEOUS
Qty: 100 EACH | Refills: 11 | Status: SHIPPED | OUTPATIENT
Start: 2022-05-09

## 2022-06-08 ENCOUNTER — TELEMEDICINE (OUTPATIENT)
Dept: ENDOCRINOLOGY | Facility: CLINIC | Age: 52
End: 2022-06-08

## 2022-06-08 DIAGNOSIS — I10 PRIMARY HYPERTENSION: ICD-10-CM

## 2022-06-08 DIAGNOSIS — E11.65 TYPE 2 DIABETES MELLITUS WITH HYPERGLYCEMIA, WITH LONG-TERM CURRENT USE OF INSULIN: Primary | ICD-10-CM

## 2022-06-08 DIAGNOSIS — E11.42 DIABETIC POLYNEUROPATHY ASSOCIATED WITH TYPE 2 DIABETES MELLITUS: ICD-10-CM

## 2022-06-08 DIAGNOSIS — E78.2 MIXED HYPERLIPIDEMIA: ICD-10-CM

## 2022-06-08 DIAGNOSIS — Z79.4 TYPE 2 DIABETES MELLITUS WITH HYPERGLYCEMIA, WITH LONG-TERM CURRENT USE OF INSULIN: Primary | ICD-10-CM

## 2022-06-08 DIAGNOSIS — E03.9 ACQUIRED HYPOTHYROIDISM: ICD-10-CM

## 2022-06-08 PROCEDURE — 99214 OFFICE O/P EST MOD 30 MIN: CPT | Performed by: NURSE PRACTITIONER

## 2022-06-08 NOTE — PROGRESS NOTES
Chief Complaint  No chief complaint on file.    Subjective          Humberto Colvin presents to [facility] ENDOCRINOLOGY  History of Present Illness       You have chosen to receive care through a telehealth visit.  Do you consent to use a video/audio connection for your medical care today? Yes          TELEHEALTH VIDEO VISIT     This a video visit due to [facility] current guidelines for social distancing due to the COVID 19 pandemic          Referring provider POORNIMA Barnes      51 year old female presents for follow up      Diabetes mellitus type 2     Duration diagnosed in 2010      Timing constant     Quality not controlled     Severity is high            Microvascular complications peripheral neuropathy             Current diabetes regimen     Insulin, glp-1         Current monitoring regimen: home blood tests - 4 times daily before crispin         Home blood sugar records:      Crispin personal use--could not downloaded       Review of Systems - General ROS: negative              Objective   Vital Signs:   There were no vitals taken for this visit.    Physical Exam  Neurological:      General: No focal deficit present.      Mental Status: She is alert.   Psychiatric:         Mood and Affect: Mood normal.         Thought Content: Thought content normal.         Judgment: Judgment normal.        Result Review :   The following data was reviewed by: POORNIMA Jessica on 06/08/2022:  Common labs    Common Labsle 8/16/21 8/16/21 3/28/22 3/28/22 3/28/22 3/28/22    1126 1126 0955 0955 0955 0955   Glucose    223 (A)     BUN    11     Creatinine    0.6 (A)     Sodium    135 (A)     Potassium    4.5     Chloride    101     Calcium    9.0     Albumin    3.9     Total Bilirubin    0.36     Alkaline Phosphatase    55     AST (SGOT)    18     ALT (SGPT)    14     Total Cholesterol  166       Total Cholesterol     102    Triglycerides  330 (A)   352 (A)    HDL Cholesterol  47   49    LDL  Cholesterol   81   UNABLE TO CALCULATE LDL DIRECT LDL ORDERED 21   Hemoglobin A1C 8.6 (A)  10.3 (A)      (A) Abnormal value       Comments are available for some flowsheets but are not being displayed.                     Assessment and Plan    Diagnoses and all orders for this visit:    1. Type 2 diabetes mellitus with hyperglycemia, with long-term current use of insulin (HCC) (Primary)    2. Acquired hypothyroidism    3. Diabetic polyneuropathy associated with type 2 diabetes mellitus (HCC)    4. Primary hypertension    5. Mixed hyperlipidemia           Glycemic Management         Diabetes mellitus type 2     Lab Results   Component Value Date    HGBA1C 10.3 (H) 03/28/2022           Crispin personal use---- could not download          taking Lantus 55 units once a day  --increase to 60 units            Taking metformin 1000 mg bid            Taking Trulicity   4.5 mg weekly            Taking Humalog 30 up to 50 units TID for each meal            Plus sliding scale of 2 per 50 above 150                                         Lipid Management      Hyperlipidemia         Taking Zetia     Taking TriCor 160     Intolerant to statins           Blood Pressure Management:           Taking lisinopril 10 mg 1 daily        Microvascular Complication Monitoring:       Eye Exam Evaluation, January 2021     -----------     Last Microalbumin-Proteinuria Assessment     May 2021     Negative microabuminuria      -----------        Neuropathy, yes      Taking gabapentin 300 mg 3 times a day                    Weight Related:      Obesity               Bone Health               Lab Results   Component Value Date     CALCIUM 9.1 09/18/2015            Thyroid Health        Hypothyroidism      Taking levothyroxine        March 2022    TSH - 3.6      Other Diabetes Related Aspects                   Lab Results   Component Value Date     [id number] 228 08/16/2021          taking b 12 supplement                              Follow Up   No  follow-ups on file.  Patient was given instructions and counseling regarding her condition or for health maintenance advice. Please see specific information pulled into the AVS if appropriate.         This document has been electronically signed by POORNIMA Jessica on June 8, 2022 09:49 CDT.

## 2022-07-27 ENCOUNTER — DOCUMENTATION (OUTPATIENT)
Dept: ENDOCRINOLOGY | Facility: CLINIC | Age: 52
End: 2022-07-27

## 2022-08-04 RX ORDER — DULAGLUTIDE 4.5 MG/.5ML
4.5 INJECTION, SOLUTION SUBCUTANEOUS WEEKLY
Qty: 2 ML | Refills: 11 | Status: SHIPPED | OUTPATIENT
Start: 2022-08-04 | End: 2023-01-12

## 2022-09-21 ENCOUNTER — OFFICE VISIT (OUTPATIENT)
Dept: ENDOCRINOLOGY | Facility: CLINIC | Age: 52
End: 2022-09-21

## 2022-09-21 VITALS
SYSTOLIC BLOOD PRESSURE: 118 MMHG | WEIGHT: 172.8 LBS | OXYGEN SATURATION: 98 % | RESPIRATION RATE: 18 BRPM | BODY MASS INDEX: 34.84 KG/M2 | HEIGHT: 59 IN | HEART RATE: 76 BPM | DIASTOLIC BLOOD PRESSURE: 68 MMHG

## 2022-09-21 DIAGNOSIS — E11.65 TYPE 2 DIABETES MELLITUS WITH HYPERGLYCEMIA, WITH LONG-TERM CURRENT USE OF INSULIN: Primary | ICD-10-CM

## 2022-09-21 DIAGNOSIS — Z79.4 TYPE 2 DIABETES MELLITUS WITH HYPERGLYCEMIA, WITH LONG-TERM CURRENT USE OF INSULIN: Primary | ICD-10-CM

## 2022-09-21 DIAGNOSIS — E11.69 MIXED DIABETIC HYPERLIPIDEMIA ASSOCIATED WITH TYPE 2 DIABETES MELLITUS: ICD-10-CM

## 2022-09-21 DIAGNOSIS — E78.2 MIXED DIABETIC HYPERLIPIDEMIA ASSOCIATED WITH TYPE 2 DIABETES MELLITUS: ICD-10-CM

## 2022-09-21 DIAGNOSIS — E03.9 ACQUIRED HYPOTHYROIDISM: ICD-10-CM

## 2022-09-21 DIAGNOSIS — I15.2 HYPERTENSION ASSOCIATED WITH DIABETES: ICD-10-CM

## 2022-09-21 DIAGNOSIS — E11.59 HYPERTENSION ASSOCIATED WITH DIABETES: ICD-10-CM

## 2022-09-21 PROCEDURE — 99214 OFFICE O/P EST MOD 30 MIN: CPT | Performed by: NURSE PRACTITIONER

## 2022-09-21 RX ORDER — SPIRONOLACTONE 25 MG/1
TABLET ORAL
COMMUNITY
Start: 2022-09-07

## 2022-09-21 RX ORDER — INSULIN PUMP CONTROLLER
EACH MISCELLANEOUS
COMMUNITY
Start: 2022-08-31

## 2022-09-21 RX ORDER — FUROSEMIDE 40 MG/1
1 TABLET ORAL DAILY
COMMUNITY
Start: 2022-08-03

## 2022-09-21 RX ORDER — CYANOCOBALAMIN 1000 UG/ML
INJECTION, SOLUTION INTRAMUSCULAR; SUBCUTANEOUS
COMMUNITY
Start: 2022-08-03

## 2022-09-21 RX ORDER — NITROGLYCERIN 80 MG/1
1 PATCH TRANSDERMAL DAILY
COMMUNITY
Start: 2022-05-25 | End: 2023-05-26

## 2022-09-21 NOTE — PROGRESS NOTES
"Chief Complaint  Follow-up and Diabetes (3 mo fu .thyroid)    Subjective          Humberto Colvin presents to [facility] ENDOCRINOLOGY  History of Present Illness         In office visit         Referring provider POORNIMA Barnes      52 year old female presents for follow up      Diabetes mellitus type 2     Duration diagnosed in 2010      Timing constant     Quality not controlled     Severity is high            Microvascular complications peripheral neuropathy             Current diabetes regimen     Insulin, glp-1         Current monitoring regimen: home blood tests - 4 times daily before ole         Home blood sugar records:      Using ole       Started the omnipod pump today before the visit         Review of Systems - General ROS: negative                Objective   Vital Signs:   /68   Pulse 76   Resp 18   Ht 149.9 cm (59\")   Wt 78.4 kg (172 lb 12.8 oz)   SpO2 98%   BMI 34.90 kg/m²     Physical Exam  Constitutional:       Appearance: Normal appearance.   Cardiovascular:      Rate and Rhythm: Regular rhythm.      Heart sounds: Normal heart sounds.   Pulmonary:      Breath sounds: Normal breath sounds.   Musculoskeletal:      Cervical back: Normal range of motion.   Neurological:      General: No focal deficit present.      Mental Status: She is alert.   Psychiatric:         Mood and Affect: Mood normal.         Thought Content: Thought content normal.         Judgment: Judgment normal.        Result Review :   The following data was reviewed by: POORNIMA Jessica on 06/08/2022:  Common labs    Common Labs 3/28/22 3/28/22 3/28/22 3/28/22    0955 0955 0955 0955   Glucose  223 (A)     BUN  11     Creatinine  0.6 (A)     Sodium  135 (A)     Potassium  4.5     Chloride  101     Calcium  9.0     Albumin  3.9     Total Bilirubin  0.36     Alkaline Phosphatase  55     AST (SGOT)  18     ALT (SGPT)  14     Total Cholesterol   102    Triglycerides   352 (A)  "   HDL Cholesterol   49    LDL Cholesterol    UNABLE TO CALCULATE LDL DIRECT LDL ORDERED 21   Hemoglobin A1C 10.3 (A)      (A) Abnormal value       Comments are available for some flowsheets but are not being displayed.                       Assessment and Plan    Diagnoses and all orders for this visit:    1. Type 2 diabetes mellitus with hyperglycemia, with long-term current use of insulin (HCC) (Primary)    2. Mixed diabetic hyperlipidemia associated with type 2 diabetes mellitus ([facility])    3. Hypertension associated with diabetes ([facility])    4. Acquired hypothyroidism    Other orders  -     insulin lispro (HumaLOG) 100 UNIT/ML injection; 100 units daily ,E10.65  Dispense: 30 mL; Refill: 12           Glycemic Management         Diabetes mellitus type 2     Lab Results   Component Value Date    HGBA1C 10.3 (H) 03/28/2022           [employer] personal use    Could not downloaded       Now on omnipod --started today before our visit so no changes today       Basal     MN - 1.8      Carb ratio    6          Correction     35          Target     120            Taking metformin 1000 mg bid -keep            Taking Trulicity   4.5 mg weekly --keep          Stop lantus and humalog                                               Lipid Management      Hyperlipidemia         Taking Zetia     Taking TriCor 160     Intolerant to statins           Blood Pressure Management:           Taking lisinopril 10 mg 1 daily        Microvascular Complication Monitoring:       Eye Exam Evaluation, January 2021     -----------     Last Microalbumin-Proteinuria Assessment     May 2021     Negative microabuminuria      -----------        Neuropathy, yes      Taking gabapentin 300 mg 3 times a day                    Weight Related:      Obesity               Bone Health               Lab Results   Component Value Date     CALCIUM 9.1 09/18/2015            Thyroid Health        Hypothyroidism      Taking levothyroxine        March 2022    TSH - 3.6       Other Diabetes Related Aspects                   Lab Results   Component Value Date     [id number] 228 08/16/2021          taking b 12 supplement                              Follow Up   Return in about 3 months (around 12/21/2022) for Recheck.  Patient was given instructions and counseling regarding her condition or for health maintenance advice. Please see specific information pulled into the AVS if appropriate.         This document has been electronically signed by POORNIMA Jessica on September 21, 2022 10:47 CDT.

## 2023-01-12 ENCOUNTER — TELEMEDICINE (OUTPATIENT)
Dept: ENDOCRINOLOGY | Facility: CLINIC | Age: 53
End: 2023-01-12
Payer: COMMERCIAL

## 2023-01-12 DIAGNOSIS — I10 PRIMARY HYPERTENSION: ICD-10-CM

## 2023-01-12 DIAGNOSIS — E78.2 MIXED HYPERLIPIDEMIA: ICD-10-CM

## 2023-01-12 DIAGNOSIS — Z79.4 TYPE 2 DIABETES MELLITUS WITH HYPERGLYCEMIA, WITH LONG-TERM CURRENT USE OF INSULIN: Primary | ICD-10-CM

## 2023-01-12 DIAGNOSIS — E11.42 DIABETIC POLYNEUROPATHY ASSOCIATED WITH TYPE 2 DIABETES MELLITUS: ICD-10-CM

## 2023-01-12 DIAGNOSIS — E03.9 ACQUIRED HYPOTHYROIDISM: ICD-10-CM

## 2023-01-12 DIAGNOSIS — E11.65 TYPE 2 DIABETES MELLITUS WITH HYPERGLYCEMIA, WITH LONG-TERM CURRENT USE OF INSULIN: Primary | ICD-10-CM

## 2023-01-12 PROCEDURE — 99214 OFFICE O/P EST MOD 30 MIN: CPT | Performed by: NURSE PRACTITIONER

## 2023-01-12 RX ORDER — SEMAGLUTIDE 1.34 MG/ML
0.5 INJECTION, SOLUTION SUBCUTANEOUS WEEKLY
Qty: 2 ML | Refills: 11 | Status: SHIPPED | OUTPATIENT
Start: 2023-01-12

## 2023-01-12 NOTE — PROGRESS NOTES
Chief Complaint  Diabetes    Subjective          Humberto Colvin presents to [facility] ENDOCRINOLOGY  Diabetes           You have chosen to receive care through a telehealth visit.  Do you consent to use a video/audio connection for your medical care today? Yes        TELEHEALTH VIDEO VISIT     This a video visit due to [facility] current guidelines for social distancing due to the COVID 19 pandemic      Mode of Visit: Video  Location of patient: home  You have chosen to receive care through a telehealth visit.  Does the patient consent to use a video/audio connection for your medical care today? Yes  The visit included audio and video interaction. No technical issues occurred during this visit.       Referring provider POORNIMA Barnes      52 year old female presents for follow up      Diabetes mellitus type 2     Duration diagnosed in 2010      Timing constant     Quality not controlled     Severity is high            Microvascular complications peripheral neuropathy             Current diabetes regimen     Insulin, glp-1         Current monitoring regimen: home blood tests - 4 times daily before ole         Home blood sugar records:      Using ole       Am up to 300             Review of Systems - General ROS: negative                Objective   Vital Signs:   There were no vitals taken for this visit.    Physical Exam  Neurological:      General: No focal deficit present.      Mental Status: She is alert.   Psychiatric:         Mood and Affect: Mood normal.         Thought Content: Thought content normal.         Judgment: Judgment normal.        Result Review :   The following data was reviewed by: POORNIMA Jessica on 06/08/2022:  Common labs    Common Labs 3/28/22 3/28/22 3/28/22 3/28/22 1/3/23 1/3/23    0955 0955 0955 0955 0941 0941   Glucose  223 (A)   373 (A)    BUN  11   10    Creatinine  0.6 (A)   0.8    Sodium  135 (A)   140    Potassium  4.5   4.6    Chloride   101   102    Calcium  9.0   9.1    Albumin  3.9   3.9    Total Bilirubin  0.36   0.27 (A)    Alkaline Phosphatase  55   66    AST (SGOT)  18   18    ALT (SGPT)  14   17    Total Cholesterol   102      Triglycerides   352 (A)      HDL Cholesterol   49      LDL Cholesterol    UNABLE TO CALCULATE LDL DIRECT LDL ORDERED 21     Hemoglobin A1C 10.3 (A)     11.5 (A)   (A) Abnormal value       Comments are available for some flowsheets but are not being displayed.                       Assessment and Plan    Diagnoses and all orders for this visit:    1. Type 2 diabetes mellitus with hyperglycemia, with long-term current use of insulin (HCC) (Primary)    2. Acquired hypothyroidism    3. Mixed hyperlipidemia    4. Primary hypertension    5. Diabetic polyneuropathy associated with type 2 diabetes mellitus ([facility])           Glycemic Management         Diabetes mellitus type 2     Lab Results   Component Value Date    HGBA1C 11.5 (H) 01/03/2023           [employer] personal use    Could not downloaded           Now on omnipod --         Basal     MN - 1.8--increase to 1.9   Added 6 ma       Carb ratio    6          Correction     35          Target     120            Taking metformin 1000 mg bid -keep            Taking Trulicity   4.5 mg weekly - change to Ozempic 0.5 mg weekly         Stop lantus and humalog                   Lipid Management      Hyperlipidemia         Taking Zetia     Taking TriCor 160     Intolerant to statins           Blood Pressure Management:           Taking lisinopril 10 mg 1 daily        Microvascular Complication Monitoring:       Eye Exam Evaluation, January 2021     -----------     Last Microalbumin-Proteinuria Assessment     May 2021     Negative microabuminuria      -----------        Neuropathy, yes      Taking gabapentin 300 mg 3 times a day                    Weight Related:      Obesity               Bone Health               Lab Results   Component Value Date     CALCIUM 9.1 09/18/2015             Thyroid Health        Hypothyroidism      Taking levothyroxine        1-2023     TSH normal      Other Diabetes Related Aspects      b12 def.         taking b 12 supplement                              Follow Up   No follow-ups on file.  Patient was given instructions and counseling regarding her condition or for health maintenance advice. Please see specific information pulled into the AVS if appropriate.         This document has been electronically signed by POORNIMA Jessica on January 12, 2023 09:01 CST.

## 2023-02-16 ENCOUNTER — DOCUMENTATION (OUTPATIENT)
Dept: ENDOCRINOLOGY | Facility: CLINIC | Age: 53
End: 2023-02-16
Payer: COMMERCIAL

## 2023-02-16 NOTE — PROGRESS NOTES
PA FOR OZEMPIC SUBMITTED VIA [employer].    OZEMPIC APPROVED FROM 2/16/23 TO 2/15/24    SENT TO MED REC

## 2023-04-25 ENCOUNTER — TELEMEDICINE (OUTPATIENT)
Dept: ENDOCRINOLOGY | Facility: CLINIC | Age: 53
End: 2023-04-25
Payer: COMMERCIAL

## 2023-04-25 DIAGNOSIS — Z79.4 TYPE 2 DIABETES MELLITUS WITH HYPERGLYCEMIA, WITH LONG-TERM CURRENT USE OF INSULIN: Primary | ICD-10-CM

## 2023-04-25 DIAGNOSIS — I10 PRIMARY HYPERTENSION: ICD-10-CM

## 2023-04-25 DIAGNOSIS — E11.42 DIABETIC POLYNEUROPATHY ASSOCIATED WITH TYPE 2 DIABETES MELLITUS: ICD-10-CM

## 2023-04-25 DIAGNOSIS — E78.2 MIXED HYPERLIPIDEMIA: ICD-10-CM

## 2023-04-25 DIAGNOSIS — E11.65 TYPE 2 DIABETES MELLITUS WITH HYPERGLYCEMIA, WITH LONG-TERM CURRENT USE OF INSULIN: Primary | ICD-10-CM

## 2023-04-25 RX ORDER — SEMAGLUTIDE 1.34 MG/ML
1 INJECTION, SOLUTION SUBCUTANEOUS WEEKLY
Qty: 9 ML | Refills: 11 | Status: SHIPPED | OUTPATIENT
Start: 2023-04-25

## 2023-04-25 NOTE — PROGRESS NOTES
Chief Complaint  Diabetes     Subjective          Humberto Colvin presents to [facility] ENDOCRINOLOGY  Diabetes           You have chosen to receive care through a telehealth visit.  Do you consent to use a video/audio connection for your medical care today? Yes        TELEHEALTH VIDEO VISIT     This a video visit due to [facility] current guidelines for social distancing due to the COVID 19 pandemic      Mode of Visit: Video  Location of patient: home  You have chosen to receive care through a telehealth visit.  Does the patient consent to use a video/audio connection for your medical care today? Yes  The visit included audio and video interaction. No technical issues occurred during this visit.     Provide POORNIMA Lopes     52 year old female presents for follow up       Diabetes mellitus type 2    Diagnosed in 2010     Timing constant     Quality not controlled    Severity high     Complications neuropathy    Current diabetes regimen    GLP-1 and insulin    She is currently off the pump giving injections she has received her new pump but has not transferred settings      Glucose monitoring    Checks 6 times daily    Uses the ole    Unable to download    States without her pump she is running 300 and higher    Review of Systems - General ROS: negative            Objective   Vital Signs:   There were no vitals taken for this visit.    Physical Exam  Neurological:      General: No focal deficit present.      Mental Status: She is alert.   Psychiatric:         Mood and Affect: Mood normal.         Thought Content: Thought content normal.         Judgment: Judgment normal.        Result Review :   The following data was reviewed by: POORNIMA Jessica on 06/08/2022:  Common labs        1/3/2023    08:41 4/22/2023    11:21 4/22/2023    11:22   Common Labs   Glucose 373      435         BUN 10      12         Creatinine 0.8      0.9         Sodium 140      136          Potassium 4.6      4.6         Chloride 102      97         Calcium 9.1      8.8         Albumin 3.9      3.5         Total Bilirubin 0.27      0.70         Alkaline Phosphatase 66      105         AST (SGOT) 18      29         ALT (SGPT) 17      21         Total Cholesterol   225        Triglycerides   728        HDL Cholesterol   47        LDL Cholesterol    79        Hemoglobin A1C 11.5      13.7             This result is from an external source.                 Assessment and Plan    Diagnoses and all orders for this visit:    1. Type 2 diabetes mellitus with hyperglycemia, with long-term current use of insulin (Primary)    2. Diabetic polyneuropathy associated with type 2 diabetes mellitus    3. Primary hypertension    4. Mixed hyperlipidemia    Other orders  -     Semaglutide, 1 MG/DOSE, (Ozempic, 1 MG/DOSE,) 4 MG/3ML solution pen-injector; Inject 1 mg under the skin into the appropriate area as directed 1 (One) Time Per Week.  Dispense: 9 mL; Refill: 11           Glycemic Management         Diabetes mellitus type 2     Lab Results   Component Value Date    HGBA1C 13.7 (H) 04/22/2023           [employer] personal use    Could not downloaded       Off pump     Now on omnipod --         Basal     MN - 1.9         Carb ratio    6          Correction     35          Target     120            Taking metformin 1000 mg bid -keep            Ozempic 0.5 mg weekly--increase to 1 mg weekly       Of asked Wyatt to call her today and get her settings and her new pump so that we can restart the pump today             Stop lantus and humalog                   Lipid Management      Hyperlipidemia         Taking Zetia     Taking TriCor 160     Intolerant to statins           Blood Pressure Management:             Taking lisinopril 10 mg 1 daily        Microvascular Complication Monitoring:         Eye Exam Evaluation, January 2022          May 2021     Negative microabuminuria      -----------        Neuropathy, yes      Taking  gabapentin 300 mg 3 times a day                         Bone Health               Lab Results   Component Value Date     CALCIUM 9.1 09/18/2015            Thyroid Health        Hypothyroidism      Taking levothyroxine        1-2023     TSH normal      Other Diabetes Related Aspects      b12 def.         taking b 12 supplement                              Follow Up   No follow-ups on file.  Patient was given instructions and counseling regarding her condition or for health maintenance advice. Please see specific information pulled into the AVS if appropriate.         This document has been electronically signed by POORNIMA Jessica on April 25, 2023 09:20 CDT.

## 2023-08-07 ENCOUNTER — TELEMEDICINE (OUTPATIENT)
Dept: ENDOCRINOLOGY | Facility: CLINIC | Age: 53
End: 2023-08-07
Payer: COMMERCIAL

## 2023-08-07 DIAGNOSIS — E55.9 VITAMIN D DEFICIENCY: ICD-10-CM

## 2023-08-07 DIAGNOSIS — E11.69 MIXED DIABETIC HYPERLIPIDEMIA ASSOCIATED WITH TYPE 2 DIABETES MELLITUS: ICD-10-CM

## 2023-08-07 DIAGNOSIS — E03.9 ACQUIRED HYPOTHYROIDISM: ICD-10-CM

## 2023-08-07 DIAGNOSIS — Z79.4 TYPE 2 DIABETES MELLITUS WITH HYPERGLYCEMIA, WITH LONG-TERM CURRENT USE OF INSULIN: Primary | ICD-10-CM

## 2023-08-07 DIAGNOSIS — E11.59 HYPERTENSION ASSOCIATED WITH DIABETES: ICD-10-CM

## 2023-08-07 DIAGNOSIS — E78.2 MIXED DIABETIC HYPERLIPIDEMIA ASSOCIATED WITH TYPE 2 DIABETES MELLITUS: ICD-10-CM

## 2023-08-07 DIAGNOSIS — E11.65 TYPE 2 DIABETES MELLITUS WITH HYPERGLYCEMIA, WITH LONG-TERM CURRENT USE OF INSULIN: Primary | ICD-10-CM

## 2023-08-07 DIAGNOSIS — I15.2 HYPERTENSION ASSOCIATED WITH DIABETES: ICD-10-CM

## 2023-08-07 DIAGNOSIS — E11.42 DIABETIC POLYNEUROPATHY ASSOCIATED WITH TYPE 2 DIABETES MELLITUS: ICD-10-CM

## 2023-08-07 PROCEDURE — 99214 OFFICE O/P EST MOD 30 MIN: CPT | Performed by: NURSE PRACTITIONER

## 2023-08-07 PROCEDURE — 1160F RVW MEDS BY RX/DR IN RCRD: CPT | Performed by: NURSE PRACTITIONER

## 2023-08-07 PROCEDURE — 1159F MED LIST DOCD IN RCRD: CPT | Performed by: NURSE PRACTITIONER

## 2023-08-07 RX ORDER — SEMAGLUTIDE 1.34 MG/ML
1 INJECTION, SOLUTION SUBCUTANEOUS WEEKLY
Qty: 9 ML | Refills: 11 | Status: SHIPPED | OUTPATIENT
Start: 2023-08-07

## 2023-08-07 NOTE — PROGRESS NOTES
Chief Complaint  Diabetes     Subjective          Humberto Colvin presents to [facility] ENDOCRINOLOGY  Diabetes         You have chosen to receive care through a telehealth visit.  Do you consent to use a video/audio connection for your medical care today? Yes        TELEHEALTH VIDEO VISIT     This a video visit due to [facility] current guidelines for social distancing due to the COVID 19 pandemic      Mode of Visit: Video  Location of patient: home  You have chosen to receive care through a telehealth visit.  Does the patient consent to use a video/audio connection for your medical care today? Yes  The visit included audio and video interaction. No technical issues occurred during this visit.     Provide POORNIMA Lopes     52 year old female presents for follow up     Reason -- diabetes mellitus type 2    Duration -- diagnosed in 2010     Timing constant     Quality not controlled    Severity high     Complications --  neuropathy           Current diabetes regimen    GLP-1 and insulin    Off pump     Glucose monitoring    Checks 6 times daily    Uses the ole    Unable to download      Review of Systems - General ROS: negative              Objective   Vital Signs:   There were no vitals taken for this visit.    Physical Exam  Neurological:      General: No focal deficit present.      Mental Status: She is alert.   Psychiatric:         Mood and Affect: Mood normal.         Thought Content: Thought content normal.         Judgment: Judgment normal.      Result Review :   The following data was reviewed by: POORNIMA Jessica on 06/08/2022:  Common labs          1/3/2023    08:41 4/22/2023    11:21 4/22/2023    11:22   Common Labs   Glucose 373     435        BUN 10     12        Creatinine 0.8     0.9        Sodium 140     136        Potassium 4.6     4.6        Chloride 102     97        Calcium 9.1     8.8        Albumin 3.9     3.5        Total Bilirubin 0.27     0.70         Alkaline Phosphatase 66     105        AST (SGOT) 18     29        ALT (SGPT) 17     21        Total Cholesterol   225       Triglycerides   728       HDL Cholesterol   47       LDL Cholesterol    79       Hemoglobin A1C 11.5     13.7           Details          This result is from an external source.                       Assessment and Plan    Diagnoses and all orders for this visit:    1. Type 2 diabetes mellitus with hyperglycemia, with long-term current use of insulin (Primary)  -     CBC & Differential; Future  -     Comprehensive Metabolic Panel; Future  -     Hemoglobin A1c; Future  -     Microalbumin / Creatinine Urine Ratio - Urine, Clean Catch; Future  -     Lipid Panel; Future  -     TSH; Future  -     Vitamin B12; Future  -     Vitamin D,25-Hydroxy; Future    2. Mixed diabetic hyperlipidemia associated with type 2 diabetes mellitus  -     CBC & Differential; Future  -     Comprehensive Metabolic Panel; Future  -     Hemoglobin A1c; Future  -     Microalbumin / Creatinine Urine Ratio - Urine, Clean Catch; Future  -     Lipid Panel; Future  -     TSH; Future  -     Vitamin B12; Future  -     Vitamin D,25-Hydroxy; Future    3. Hypertension associated with diabetes  -     CBC & Differential; Future  -     Comprehensive Metabolic Panel; Future  -     Hemoglobin A1c; Future  -     Microalbumin / Creatinine Urine Ratio - Urine, Clean Catch; Future  -     Lipid Panel; Future  -     TSH; Future  -     Vitamin B12; Future  -     Vitamin D,25-Hydroxy; Future    4. Vitamin D deficiency  -     CBC & Differential; Future  -     Comprehensive Metabolic Panel; Future  -     Hemoglobin A1c; Future  -     Microalbumin / Creatinine Urine Ratio - Urine, Clean Catch; Future  -     Lipid Panel; Future  -     TSH; Future  -     Vitamin B12; Future  -     Vitamin D,25-Hydroxy; Future    5. Acquired hypothyroidism  -     CBC & Differential; Future  -     Comprehensive Metabolic Panel; Future  -     Hemoglobin A1c; Future  -      Microalbumin / Creatinine Urine Ratio - Urine, Clean Catch; Future  -     Lipid Panel; Future  -     TSH; Future  -     Vitamin B12; Future  -     Vitamin D,25-Hydroxy; Future    6. Diabetic polyneuropathy associated with type 2 diabetes mellitus  -     CBC & Differential; Future  -     Comprehensive Metabolic Panel; Future  -     Hemoglobin A1c; Future  -     Microalbumin / Creatinine Urine Ratio - Urine, Clean Catch; Future  -     Lipid Panel; Future  -     TSH; Future  -     Vitamin B12; Future  -     Vitamin D,25-Hydroxy; Future    Other orders  -     Semaglutide, 1 MG/DOSE, (Ozempic, 1 MG/DOSE,) 4 MG/3ML solution pen-injector; Inject 1 mg under the skin into the appropriate area as directed 1 (One) Time Per Week.  Dispense: 9 mL; Refill: 11             Glycemic Management         Diabetes mellitus type 2     Lab Results   Component Value Date    HGBA1C 13.7 (H) 04/22/2023           [employer] personal use    Could not downloaded           Has  omnipod --using injections until gets a new           Basal     MN - 1.9         Carb ratio    6          Correction     35          Target     120            Taking metformin 1000 mg bid -keep            Ozempic 1 mg weekly       Of asked Wyatt to call her today and get her settings and her new pump so that we can restart the pump today             Stop lantus and humalog                   Lipid Management      Hyperlipidemia         Taking Zetia     Taking TriCor 160     Intolerant to statins           Blood Pressure Management:             Taking lisinopril 10 mg 1 daily        Microvascular Complication Monitoring:         Eye Exam Evaluation, January 2022          May 2021     Negative microabuminuria      -----------        Neuropathy, yes      Taking gabapentin 300 mg 3 times a day                         Bone Health               Lab Results   Component Value Date     CALCIUM 9.1 09/18/2015            Thyroid Health        Hypothyroidism      Taking  levothyroxine      Jan. 2023     TSH normal      Other Diabetes Related Aspects      b12 def.         taking b 12 supplement                              Follow Up   No follow-ups on file.  Patient was given instructions and counseling regarding her condition or for health maintenance advice. Please see specific information pulled into the AVS if appropriate.         This document has been electronically signed by POORNIMA Jessica on August 7, 2023 14:22 CDT.